# Patient Record
Sex: FEMALE | Race: WHITE | NOT HISPANIC OR LATINO | Employment: OTHER | URBAN - METROPOLITAN AREA
[De-identification: names, ages, dates, MRNs, and addresses within clinical notes are randomized per-mention and may not be internally consistent; named-entity substitution may affect disease eponyms.]

---

## 2017-02-21 ENCOUNTER — OFFICE VISIT (OUTPATIENT)
Dept: OBSTETRICS AND GYNECOLOGY | Facility: CLINIC | Age: 62
End: 2017-02-21

## 2017-02-21 VITALS
DIASTOLIC BLOOD PRESSURE: 60 MMHG | BODY MASS INDEX: 35.04 KG/M2 | WEIGHT: 190.4 LBS | HEIGHT: 62 IN | SYSTOLIC BLOOD PRESSURE: 110 MMHG

## 2017-02-21 DIAGNOSIS — R39.9 UTI SYMPTOMS: Primary | ICD-10-CM

## 2017-02-21 LAB
BILIRUB BLD-MCNC: NEGATIVE MG/DL
CLARITY, POC: CLEAR
COLOR UR: YELLOW
GLUCOSE UR STRIP-MCNC: ABNORMAL MG/DL
KETONES UR QL: NEGATIVE
LEUKOCYTE EST, POC: NEGATIVE
NITRITE UR-MCNC: NEGATIVE MG/ML
PH UR: 7 [PH] (ref 5–8)
PROT UR STRIP-MCNC: NEGATIVE MG/DL
RBC # UR STRIP: NEGATIVE /UL
SP GR UR: 1.01 (ref 1–1.03)
UROBILINOGEN UR QL: NORMAL

## 2017-02-21 PROCEDURE — G0101 CA SCREEN;PELVIC/BREAST EXAM: HCPCS | Performed by: NURSE PRACTITIONER

## 2017-02-21 PROCEDURE — 81002 URINALYSIS NONAUTO W/O SCOPE: CPT | Performed by: NURSE PRACTITIONER

## 2017-02-21 RX ORDER — INSULIN DEGLUDEC 200 U/ML
32 INJECTION, SOLUTION SUBCUTANEOUS DAILY
Refills: 0 | COMMUNITY
Start: 2016-12-29

## 2017-02-21 RX ORDER — OMEPRAZOLE 40 MG/1
40 CAPSULE, DELAYED RELEASE ORAL DAILY PRN
Refills: 4 | COMMUNITY
Start: 2017-02-08 | End: 2020-06-04

## 2017-02-21 RX ORDER — DULOXETIN HYDROCHLORIDE 60 MG/1
60 CAPSULE, DELAYED RELEASE ORAL DAILY
Refills: 1 | COMMUNITY
Start: 2017-02-09

## 2017-02-21 NOTE — PROGRESS NOTES
Tran Parra is a 61 y.o. female.   Chief Complaint   Patient presents with   • Gynecologic Exam     patient here for annual.      HPI:pt here for annual exam, no c/o since last visit    The following portions of the patient's history were reviewed and updated as appropriate: allergies, current medications, past family history, past medical history, past social history, past surgical history and problem list.    Review of Systems  Review of Systems   Constitutional: Negative.  Negative for unexpected weight change.   Respiratory: Negative for chest tightness and shortness of breath.    Cardiovascular: Negative for chest pain and palpitations.   Gastrointestinal: Negative for abdominal pain and blood in stool.   Endocrine: Negative.    Genitourinary: Negative for dyspareunia, dysuria, frequency, hematuria, menstrual problem, pelvic pain, vaginal bleeding, vaginal discharge and vaginal pain.   Musculoskeletal: Negative for joint swelling.   Skin: Negative for color change, rash and wound.   Allergic/Immunologic: Negative.    Psychiatric/Behavioral: Negative.    All other systems reviewed and are negative.      Objective   Physical Exam   Constitutional: She is oriented to person, place, and time. She appears well-developed and well-nourished.   HENT:   Head: Normocephalic.   Neck: Normal range of motion.   Cardiovascular: Normal rate and regular rhythm.    Pulmonary/Chest: Effort normal and breath sounds normal. Right breast exhibits no mass and no nipple discharge. Left breast exhibits no mass and no nipple discharge. There is no breast swelling.   Breasts soft without palpable masses   Abdominal: Soft. Bowel sounds are normal.   Genitourinary: Vagina normal and uterus normal. There is no rash or lesion on the right labia. There is no rash or lesion on the left labia. Cervix exhibits no friability. Right adnexum displays no mass, no tenderness and no fullness. Left adnexum displays no mass, no tenderness and no  fullness.   Genitourinary Comments: Ovaries  Within normal limits. Cervix  Within normal limits   Neurological: She is alert and oriented to person, place, and time.   Skin: Skin is warm and dry.   Psychiatric: She has a normal mood and affect. Her behavior is normal.   Vitals reviewed.      Assessment/Plan   Patient Instructions   We discussed cakcium and vit d requirements, she does not take hrt .      Tran was seen today for gynecologic exam.    Diagnoses and all orders for this visit:    UTI symptoms  -     POC Urinalysis Dipstick  -     Pap IG, Rfx HPV ASCU        Return in about 1 year (around 2/21/2018).

## 2017-02-23 LAB
CONV .: NORMAL
CYTOLOGIST CVX/VAG CYTO: NORMAL
CYTOLOGY CVX/VAG DOC THIN PREP: NORMAL
DX ICD CODE: NORMAL
HIV 1 & 2 AB SER-IMP: NORMAL
OTHER STN SPEC: NORMAL
PATH REPORT.FINAL DX SPEC: NORMAL
STAT OF ADQ CVX/VAG CYTO-IMP: NORMAL

## 2018-07-27 ENCOUNTER — TRANSCRIBE ORDERS (OUTPATIENT)
Dept: OBSTETRICS AND GYNECOLOGY | Facility: CLINIC | Age: 63
End: 2018-07-27

## 2018-07-27 DIAGNOSIS — Z12.31 VISIT FOR SCREENING MAMMOGRAM: Primary | ICD-10-CM

## 2018-09-05 DIAGNOSIS — Z12.31 VISIT FOR SCREENING MAMMOGRAM: Primary | ICD-10-CM

## 2018-09-06 ENCOUNTER — HOSPITAL ENCOUNTER (OUTPATIENT)
Dept: MAMMOGRAPHY | Facility: HOSPITAL | Age: 63
Discharge: HOME | End: 2018-09-06
Admitting: NURSE PRACTITIONER

## 2018-09-06 DIAGNOSIS — Z12.31 VISIT FOR SCREENING MAMMOGRAM: ICD-10-CM

## 2018-09-06 PROCEDURE — 77067 SCR MAMMO BI INCL CAD: CPT

## 2018-09-18 ENCOUNTER — TRANSCRIBE ORDERS (OUTPATIENT)
Dept: ADMINISTRATIVE | Facility: HOSPITAL | Age: 63
End: 2018-09-18

## 2018-09-18 DIAGNOSIS — Z78.0 ASYMPTOMATIC MENOPAUSAL STATE: Primary | ICD-10-CM

## 2018-09-24 ENCOUNTER — APPOINTMENT (OUTPATIENT)
Dept: BONE DENSITY | Facility: HOSPITAL | Age: 63
End: 2018-09-24

## 2018-09-24 DIAGNOSIS — Z78.0 ASYMPTOMATIC MENOPAUSAL STATE: ICD-10-CM

## 2018-09-24 PROCEDURE — 77080 DXA BONE DENSITY AXIAL: CPT

## 2019-02-27 ENCOUNTER — OFFICE VISIT (OUTPATIENT)
Dept: CARDIOLOGY | Facility: CLINIC | Age: 64
End: 2019-02-27

## 2019-02-27 VITALS
WEIGHT: 188 LBS | OXYGEN SATURATION: 98 % | DIASTOLIC BLOOD PRESSURE: 70 MMHG | HEIGHT: 61 IN | SYSTOLIC BLOOD PRESSURE: 120 MMHG | HEART RATE: 83 BPM | BODY MASS INDEX: 35.5 KG/M2

## 2019-02-27 DIAGNOSIS — I42.2 HYPERTROPHIC CARDIOMYOPATHY (HCC): Primary | ICD-10-CM

## 2019-02-27 PROCEDURE — 99203 OFFICE O/P NEW LOW 30 MIN: CPT | Performed by: INTERNAL MEDICINE

## 2019-02-27 PROCEDURE — 93000 ELECTROCARDIOGRAM COMPLETE: CPT | Performed by: INTERNAL MEDICINE

## 2019-02-27 RX ORDER — ASPIRIN 81 MG/1
81 TABLET ORAL DAILY
COMMUNITY
End: 2020-01-28

## 2019-02-27 RX ORDER — FLUTICASONE PROPIONATE 50 MCG
2 SPRAY, SUSPENSION (ML) NASAL DAILY
COMMUNITY

## 2019-02-27 NOTE — PROGRESS NOTES
Date of Office Visit: 2019  Encounter Provider: Deep Pardo MD  Place of Service: [facility]  Patient Name: Tran Parra  :1955  Omar Whitley MD    Cardiomyopathy      History of Present Illness    The patient is a 63-year-old white female that was seen in this office about 5 years ago.  She carries a diagnosis of a nonobstructive cardiomyopathy based on a catheterization that was done in .  There is a 10 mm outflow tract gradient.  Prior to that she had an abnormal stress test indicative of a moderate area of myocardial ischemia involving the inferior wall.  Her catheter demonstrated basically normal coronary arteries.  She was seen here once in  and at that time she was complaining of some shortness of breath but her evaluation was unremarkable.  There have been no cardiac follow-up since that time.  She is here today for a follow-up from her myopathy.  The patient states that she has a family history of hypertrophic myopathy and she wanted to make sure that things were stable.  From a symptomatic standpoint she has exertional shortness of breath as well as easy fatigability and intermittent dizziness.  She is also had syncopal episodes over the past few months.  These episodes sound like they may be autonomic dysfunction in origin.  She describes a headache followed by diaphoresis, a cold clammy feeling in the loss of her bowels.  She states she does have some mild peripheral neuropathy but not serious.    The patient has as noted above no history of coronary disease and there is no history of arrhythmias.  Past Medical History:   Diagnosis Date   • Anemia    • Diabetes mellitus (CMS/HCC)    • Disease of thyroid gland    • Hepatitis B    • Hyperlipidemia    • Hypertension    • Meniere disease    • Menopause    • OAB (overactive bladder)    • Obesity    • Pneumonia    • Shortness of breath          Past Surgical History:   Procedure  Laterality Date   • APPENDECTOMY  1993   • CARDIAC CATHETERIZATION  03/25/2013    Left Heart Catheterization   • CARDIAC CATHETERIZATION  03/25/2013    Right Heart Catheterization   • CARDIAC CATHETERIZATION  03/25/2013    Coronary Arteriography   • CARDIAC CATHETERIZATION  03/25/2013    Left Ventriculography   • CARPAL TUNNEL RELEASE  2011   • CHOLECYSTECTOMY  2002   • DILATATION AND CURETTAGE  11/13/2003    S/ DR. ZULEIKA PACK   • ENDOMETRIAL BIOPSY  04/20/2005    NORMAL   • OSTEOTOMY AND ULNAR SHORTENING  2009   • REPLACEMENT TOTAL KNEE Left 2013    DR. SOSA   • TRANSVAGINAL TAPING SUSPENSION  2004   • TUMOR REMOVAL      HAND- 2008; 2009           Current Outpatient Medications:   •  aspirin 81 MG EC tablet, Take 81 mg by mouth Daily., Disp: , Rfl:   •  B-D UF III MINI PEN NEEDLES 31G X 5 MM misc, U UTD, Disp: , Rfl: 11  •  cetirizine (zyrTEC) 10 MG tablet, Take 1 tablet by mouth Daily., Disp: 30 tablet, Rfl: 0  •  CRANBERRY PO, Take 1 tablet by mouth Daily., Disp: , Rfl:   •  DULoxetine (CYMBALTA) 60 MG capsule, TK 1 C PO D, Disp: , Rfl: 1  •  Empagliflozin-Linagliptin (GLYXAMBI) 25-5 MG tablet, Take 1 tablet by mouth Daily., Disp: , Rfl:   •  fluticasone (FLONASE) 50 MCG/ACT nasal spray, 2 sprays into the nostril(s) as directed by provider Daily., Disp: , Rfl:   •  levothyroxine (SYNTHROID, LEVOTHROID) 112 MCG tablet, Take 112 mcg by mouth Daily., Disp: , Rfl:   •  losartan (COZAAR) 50 MG tablet, Take 50 mg by mouth Daily., Disp: , Rfl:   •  meloxicam (MOBIC) 15 MG tablet, Take 15 mg by mouth Daily., Disp: , Rfl: 2  •  metFORMIN (GLUCOPHAGE) 1000 MG tablet, Take 1,000 mg by mouth 2 (Two) Times a Day With Meals., Disp: , Rfl:   •  omeprazole (priLOSEC) 40 MG capsule, TK ONE C PO  QAM, Disp: , Rfl: 4  •  TRESIBA FLEXTOUCH 200 UNIT/ML solution pen-injector, INJECT 20 UNITS UNDER THE SKIN QD, Disp: , Rfl: 0  •  triamterene-hydrochlorothiazide (MAXZIDE-25) 37.5-25 MG per tablet, Take 1 tablet by mouth Daily.,  "Disp: , Rfl:       Social History     Socioeconomic History   • Marital status:      Spouse name: RADHA   • Number of children: 3   • Years of education: Not on file   • Highest education level: Not on file   Social Needs   • Financial resource strain: Not on file   • Food insecurity - worry: Not on file   • Food insecurity - inability: Not on file   • Transportation needs - medical: Not on file   • Transportation needs - non-medical: Not on file   Occupational History     Employer: RETIRED   Tobacco Use   • Smoking status: Never Smoker   • Smokeless tobacco: Never Used   Substance and Sexual Activity   • Alcohol use: Yes     Comment: social   • Drug use: No   • Sexual activity: No     Partners: Male     Birth control/protection: Post-menopausal   Other Topics Concern   • Not on file   Social History Narrative    OB/GYN PATIENT SINCE 1988.         Review of Systems   Constitution: Positive for malaise/fatigue.   HENT: Negative.    Eyes: Negative.    Cardiovascular: Positive for dyspnea on exertion and syncope.   Respiratory: Negative.    Endocrine: Negative.    Skin: Negative.    Musculoskeletal: Negative.    Gastrointestinal: Negative.    Neurological: Positive for dizziness.   Psychiatric/Behavioral: Negative.        Procedures      ECG 12 Lead  Date/Time: 2/27/2019 3:27 PM  Performed by: Deep Pardo MD  Authorized by: Deep Pardo MD   Comparison: compared with previous ECG from 9/10/2013  Comparison to previous ECG: The loss of R wave extends now throughout the entire precordium  Rhythm: sinus rhythm  Rate: normal  QRS axis: left                  Objective:    /70 (BP Location: Left arm)   Pulse 83   Ht 154.9 cm (61\")   Wt 85.3 kg (188 lb)   SpO2 98%   BMI 35.52 kg/m²         Physical Exam   Constitutional: She is oriented to person, place, and time. She appears well-developed and well-nourished.   HENT:   Head: Normocephalic.   Eyes: Pupils are equal, round, and reactive to " light.   Neck: Normal range of motion. No JVD present. Carotid bruit is not present. No thyromegaly present.   Cardiovascular: Normal rate, regular rhythm, S1 normal, S2 normal and intact distal pulses. Exam reveals no gallop and no friction rub.   Murmur heard.   Medium-pitched mid to late systolic murmur is present with a grade of 1/6 at the upper right sternal border.  Pulmonary/Chest: Effort normal and breath sounds normal.   Abdominal: Soft. Bowel sounds are normal.   Musculoskeletal: She exhibits no edema.   Neurological: She is alert and oriented to person, place, and time.   Skin: Skin is warm, dry and intact. No erythema.   Psychiatric: She has a normal mood and affect.   Vitals reviewed.          Assessment:       Diagnosis Plan   1. Hypertrophic cardiomyopathy (CMS/HCC)  Adult Transthoracic Echo Complete W/ Cont if Necessary Per Protocol         2.  Diabetes mellitus, type II: On insulin therapy  3.  History of hypothyroidism  4.  Possible autonomic dysfunction     Plan:       Going to obtain the echocardiogram for evaluation at this time.  Further recommendations will follow.

## 2019-03-07 ENCOUNTER — HOSPITAL ENCOUNTER (OUTPATIENT)
Dept: CARDIOLOGY | Facility: HOSPITAL | Age: 64
Discharge: HOME | End: 2019-03-07
Admitting: INTERNAL MEDICINE

## 2019-03-07 VITALS
SYSTOLIC BLOOD PRESSURE: 170 MMHG | HEART RATE: 68 BPM | WEIGHT: 188 LBS | BODY MASS INDEX: 35.5 KG/M2 | HEIGHT: 61 IN | DIASTOLIC BLOOD PRESSURE: 80 MMHG

## 2019-03-07 DIAGNOSIS — I42.2 HYPERTROPHIC CARDIOMYOPATHY (HCC): ICD-10-CM

## 2019-03-07 LAB
ASCENDING AORTA: 3.8 CM
BH CV ECHO MEAS - ACS: 1.6 CM
BH CV ECHO MEAS - AO MAX PG (FULL): 2.9 MMHG
BH CV ECHO MEAS - AO MAX PG: 6.1 MMHG
BH CV ECHO MEAS - AO MEAN PG (FULL): 2.2 MMHG
BH CV ECHO MEAS - AO MEAN PG: 3.8 MMHG
BH CV ECHO MEAS - AO ROOT AREA (BSA CORRECTED): 1.7
BH CV ECHO MEAS - AO ROOT AREA: 7.6 CM^2
BH CV ECHO MEAS - AO ROOT DIAM: 3.1 CM
BH CV ECHO MEAS - AO V2 MAX: 123.1 CM/SEC
BH CV ECHO MEAS - AO V2 MEAN: 92.2 CM/SEC
BH CV ECHO MEAS - AO V2 VTI: 27.9 CM
BH CV ECHO MEAS - ASC AORTA: 3.8 CM
BH CV ECHO MEAS - AVA(I,A): 2 CM^2
BH CV ECHO MEAS - AVA(I,D): 2 CM^2
BH CV ECHO MEAS - AVA(V,A): 2.2 CM^2
BH CV ECHO MEAS - AVA(V,D): 2.2 CM^2
BH CV ECHO MEAS - BSA(HAYCOCK): 2 M^2
BH CV ECHO MEAS - BSA: 1.8 M^2
BH CV ECHO MEAS - BZI_BMI: 35.5 KILOGRAMS/M^2
BH CV ECHO MEAS - BZI_METRIC_HEIGHT: 154.9 CM
BH CV ECHO MEAS - BZI_METRIC_WEIGHT: 85.3 KG
BH CV ECHO MEAS - EDV(MOD-SP2): 45 ML
BH CV ECHO MEAS - EDV(MOD-SP4): 54 ML
BH CV ECHO MEAS - EDV(TEICH): 65.1 ML
BH CV ECHO MEAS - EF(CUBED): 80.1 %
BH CV ECHO MEAS - EF(MOD-BP): 67 %
BH CV ECHO MEAS - EF(MOD-SP2): 64.4 %
BH CV ECHO MEAS - EF(MOD-SP4): 68.5 %
BH CV ECHO MEAS - EF(TEICH): 73.2 %
BH CV ECHO MEAS - ESV(MOD-SP2): 16 ML
BH CV ECHO MEAS - ESV(MOD-SP4): 17 ML
BH CV ECHO MEAS - ESV(TEICH): 17.5 ML
BH CV ECHO MEAS - FS: 41.6 %
BH CV ECHO MEAS - IVS/LVPW: 1
BH CV ECHO MEAS - IVSD: 1.2 CM
BH CV ECHO MEAS - LAT PEAK E' VEL: 8 CM/SEC
BH CV ECHO MEAS - LV DIASTOLIC VOL/BSA (35-75): 29.3 ML/M^2
BH CV ECHO MEAS - LV MASS(C)D: 160 GRAMS
BH CV ECHO MEAS - LV MASS(C)DI: 87 GRAMS/M^2
BH CV ECHO MEAS - LV MAX PG: 3.2 MMHG
BH CV ECHO MEAS - LV MEAN PG: 1.6 MMHG
BH CV ECHO MEAS - LV SYSTOLIC VOL/BSA (12-30): 9.2 ML/M^2
BH CV ECHO MEAS - LV V1 MAX: 89.2 CM/SEC
BH CV ECHO MEAS - LV V1 MEAN: 58.2 CM/SEC
BH CV ECHO MEAS - LV V1 VTI: 18.2 CM
BH CV ECHO MEAS - LVIDD: 3.9 CM
BH CV ECHO MEAS - LVIDS: 2.3 CM
BH CV ECHO MEAS - LVLD AP2: 7.2 CM
BH CV ECHO MEAS - LVLD AP4: 7 CM
BH CV ECHO MEAS - LVLS AP2: 5.6 CM
BH CV ECHO MEAS - LVLS AP4: 5.7 CM
BH CV ECHO MEAS - LVOT AREA (M): 3.1 CM^2
BH CV ECHO MEAS - LVOT AREA: 3.1 CM^2
BH CV ECHO MEAS - LVOT DIAM: 2 CM
BH CV ECHO MEAS - LVPWD: 1.2 CM
BH CV ECHO MEAS - MED PEAK E' VEL: 8 CM/SEC
BH CV ECHO MEAS - MV A DUR: 0.11 SEC
BH CV ECHO MEAS - MV A MAX VEL: 82.9 CM/SEC
BH CV ECHO MEAS - MV DEC SLOPE: 278.4 CM/SEC^2
BH CV ECHO MEAS - MV DEC TIME: 0.21 SEC
BH CV ECHO MEAS - MV E MAX VEL: 46.1 CM/SEC
BH CV ECHO MEAS - MV E/A: 0.56
BH CV ECHO MEAS - MV MAX PG: 5.3 MMHG
BH CV ECHO MEAS - MV MEAN PG: 1.6 MMHG
BH CV ECHO MEAS - MV P1/2T MAX VEL: 50 CM/SEC
BH CV ECHO MEAS - MV P1/2T: 52.6 MSEC
BH CV ECHO MEAS - MV V2 MAX: 114.6 CM/SEC
BH CV ECHO MEAS - MV V2 MEAN: 53.7 CM/SEC
BH CV ECHO MEAS - MV V2 VTI: 21.5 CM
BH CV ECHO MEAS - MVA P1/2T LCG: 4.4 CM^2
BH CV ECHO MEAS - MVA(P1/2T): 4.2 CM^2
BH CV ECHO MEAS - MVA(VTI): 2.6 CM^2
BH CV ECHO MEAS - PA ACC TIME: 0.17 SEC
BH CV ECHO MEAS - PA MAX PG (FULL): 0.85 MMHG
BH CV ECHO MEAS - PA MAX PG: 2.2 MMHG
BH CV ECHO MEAS - PA PR(ACCEL): 1.4 MMHG
BH CV ECHO MEAS - PA V2 MAX: 74.7 CM/SEC
BH CV ECHO MEAS - PULM A REVS DUR: 0.11 SEC
BH CV ECHO MEAS - PULM A REVS VEL: 28.1 CM/SEC
BH CV ECHO MEAS - PULM DIAS VEL: 32 CM/SEC
BH CV ECHO MEAS - PULM S/D: 1.4
BH CV ECHO MEAS - PULM SYS VEL: 45.1 CM/SEC
BH CV ECHO MEAS - PVA(V,A): 2.4 CM^2
BH CV ECHO MEAS - PVA(V,D): 2.4 CM^2
BH CV ECHO MEAS - QP/QS: 0.8
BH CV ECHO MEAS - RV MAX PG: 1.4 MMHG
BH CV ECHO MEAS - RV MEAN PG: 0.85 MMHG
BH CV ECHO MEAS - RV V1 MAX: 58.7 CM/SEC
BH CV ECHO MEAS - RV V1 MEAN: 42.8 CM/SEC
BH CV ECHO MEAS - RV V1 VTI: 14.6 CM
BH CV ECHO MEAS - RVOT AREA: 3.1 CM^2
BH CV ECHO MEAS - RVOT DIAM: 2 CM
BH CV ECHO MEAS - SI(AO): 115.1 ML/M^2
BH CV ECHO MEAS - SI(CUBED): 25.4 ML/M^2
BH CV ECHO MEAS - SI(LVOT): 30.7 ML/M^2
BH CV ECHO MEAS - SI(MOD-SP2): 15.8 ML/M^2
BH CV ECHO MEAS - SI(MOD-SP4): 20.1 ML/M^2
BH CV ECHO MEAS - SI(TEICH): 25.9 ML/M^2
BH CV ECHO MEAS - SUP REN AO DIAM: 2 CM
BH CV ECHO MEAS - SV(AO): 211.8 ML
BH CV ECHO MEAS - SV(CUBED): 46.7 ML
BH CV ECHO MEAS - SV(LVOT): 56.5 ML
BH CV ECHO MEAS - SV(MOD-SP2): 29 ML
BH CV ECHO MEAS - SV(MOD-SP4): 37 ML
BH CV ECHO MEAS - SV(RVOT): 45.4 ML
BH CV ECHO MEAS - SV(TEICH): 47.6 ML
BH CV ECHO MEAS - TAPSE (>1.6): 2.3 CM2
BH CV ECHO MEASUREMENTS AVERAGE E/E' RATIO: 5.76
BH CV XLRA - RV BASE: 2.9 CM
BH CV XLRA - TDI S': 12 CM/SEC
LEFT ATRIUM VOLUME INDEX: 12 ML/M2
SINUS: 3.5 CM
STJ: 3.5 CM

## 2019-03-07 PROCEDURE — 93306 TTE W/DOPPLER COMPLETE: CPT | Performed by: INTERNAL MEDICINE

## 2019-03-07 PROCEDURE — 93306 TTE W/DOPPLER COMPLETE: CPT

## 2019-07-29 ENCOUNTER — TRANSCRIBE ORDERS (OUTPATIENT)
Dept: ADMINISTRATIVE | Facility: HOSPITAL | Age: 64
End: 2019-07-29

## 2019-07-29 DIAGNOSIS — Z13.9 VISIT FOR SCREENING: Primary | ICD-10-CM

## 2019-09-10 ENCOUNTER — APPOINTMENT (OUTPATIENT)
Dept: MAMMOGRAPHY | Facility: HOSPITAL | Age: 64
End: 2019-09-10

## 2020-01-22 ENCOUNTER — HOSPITAL ENCOUNTER (OUTPATIENT)
Dept: MAMMOGRAPHY | Facility: HOSPITAL | Age: 65
Discharge: HOME | End: 2020-01-22
Admitting: NURSE PRACTITIONER

## 2020-01-22 DIAGNOSIS — Z13.9 VISIT FOR SCREENING: ICD-10-CM

## 2020-01-22 PROCEDURE — 77063 BREAST TOMOSYNTHESIS BI: CPT

## 2020-01-22 PROCEDURE — 77067 SCR MAMMO BI INCL CAD: CPT

## 2020-01-24 ENCOUNTER — TELEPHONE (OUTPATIENT)
Dept: OBSTETRICS AND GYNECOLOGY | Age: 65
End: 2020-01-24

## 2020-01-24 NOTE — TELEPHONE ENCOUNTER
----- Message from GENESIS Hernandez sent at 1/22/2020 11:38 AM EST -----  Please call pt and notify of normal mammogram results. Repeat 1 year.

## 2020-01-28 ENCOUNTER — HOSPITAL ENCOUNTER (OUTPATIENT)
Dept: GENERAL RADIOLOGY | Facility: HOSPITAL | Age: 65
Discharge: HOME | End: 2020-01-28

## 2020-01-28 ENCOUNTER — HOSPITAL ENCOUNTER (OUTPATIENT)
Dept: GENERAL RADIOLOGY | Facility: HOSPITAL | Age: 65
Discharge: HOME | End: 2020-01-28
Admitting: ORTHOPAEDIC SURGERY

## 2020-01-28 ENCOUNTER — APPOINTMENT (OUTPATIENT)
Dept: PREADMISSION TESTING | Facility: HOSPITAL | Age: 65
End: 2020-01-28

## 2020-01-28 VITALS
SYSTOLIC BLOOD PRESSURE: 120 MMHG | RESPIRATION RATE: 18 BRPM | WEIGHT: 175 LBS | DIASTOLIC BLOOD PRESSURE: 76 MMHG | HEART RATE: 78 BPM | TEMPERATURE: 97.3 F | HEIGHT: 61 IN | BODY MASS INDEX: 33.04 KG/M2 | OXYGEN SATURATION: 94 %

## 2020-01-28 LAB
ALBUMIN SERPL-MCNC: 4 G/DL (ref 3.5–5.2)
ALBUMIN/GLOB SERPL: 1.6 G/DL
ALP SERPL-CCNC: 100 U/L (ref 39–117)
ALT SERPL W P-5'-P-CCNC: 18 U/L (ref 1–33)
ANION GAP SERPL CALCULATED.3IONS-SCNC: 13.4 MMOL/L (ref 5–15)
AST SERPL-CCNC: 18 U/L (ref 1–32)
BACTERIA UR QL AUTO: ABNORMAL /HPF
BILIRUB SERPL-MCNC: 0.3 MG/DL (ref 0.2–1.2)
BILIRUB UR QL STRIP: NEGATIVE
BUN BLD-MCNC: 11 MG/DL (ref 8–23)
BUN/CREAT SERPL: 9.6 (ref 7–25)
CALCIUM SPEC-SCNC: 8.9 MG/DL (ref 8.6–10.5)
CHLORIDE SERPL-SCNC: 93 MMOL/L (ref 98–107)
CLARITY UR: CLEAR
CO2 SERPL-SCNC: 25.6 MMOL/L (ref 22–29)
COLOR UR: YELLOW
CREAT BLD-MCNC: 1.15 MG/DL (ref 0.57–1)
DEPRECATED RDW RBC AUTO: 40.6 FL (ref 37–54)
ERYTHROCYTE [DISTWIDTH] IN BLOOD BY AUTOMATED COUNT: 15.1 % (ref 12.3–15.4)
GFR SERPL CREATININE-BSD FRML MDRD: 47 ML/MIN/1.73
GLOBULIN UR ELPH-MCNC: 2.5 GM/DL
GLUCOSE BLD-MCNC: 300 MG/DL (ref 65–99)
GLUCOSE UR STRIP-MCNC: ABNORMAL MG/DL
HBA1C MFR BLD: 8.1 % (ref 4.8–5.6)
HCT VFR BLD AUTO: 39.1 % (ref 34–46.6)
HGB BLD-MCNC: 12.4 G/DL (ref 12–15.9)
HGB UR QL STRIP.AUTO: ABNORMAL
HYALINE CASTS UR QL AUTO: ABNORMAL /LPF
INR PPP: 0.88 (ref 0.9–1.1)
KETONES UR QL STRIP: NEGATIVE
LEUKOCYTE ESTERASE UR QL STRIP.AUTO: ABNORMAL
MCH RBC QN AUTO: 24.3 PG (ref 26.6–33)
MCHC RBC AUTO-ENTMCNC: 31.7 G/DL (ref 31.5–35.7)
MCV RBC AUTO: 76.5 FL (ref 79–97)
NITRITE UR QL STRIP: NEGATIVE
PH UR STRIP.AUTO: 6 [PH] (ref 5–8)
PLATELET # BLD AUTO: 219 10*3/MM3 (ref 140–450)
PMV BLD AUTO: 9.9 FL (ref 6–12)
POTASSIUM BLD-SCNC: 4.1 MMOL/L (ref 3.5–5.2)
PROT SERPL-MCNC: 6.5 G/DL (ref 6–8.5)
PROT UR QL STRIP: NEGATIVE
PROTHROMBIN TIME: 11.7 SECONDS (ref 11.7–14.2)
RBC # BLD AUTO: 5.11 10*6/MM3 (ref 3.77–5.28)
RBC # UR: ABNORMAL /HPF
REF LAB TEST METHOD: ABNORMAL
SODIUM BLD-SCNC: 132 MMOL/L (ref 136–145)
SP GR UR STRIP: 1.02 (ref 1–1.03)
SQUAMOUS #/AREA URNS HPF: ABNORMAL /HPF
UROBILINOGEN UR QL STRIP: ABNORMAL
WBC NRBC COR # BLD: 3.8 10*3/MM3 (ref 3.4–10.8)
WBC UR QL AUTO: ABNORMAL /HPF
YEAST URNS QL MICRO: ABNORMAL /HPF

## 2020-01-28 PROCEDURE — 73502 X-RAY EXAM HIP UNI 2-3 VIEWS: CPT

## 2020-01-28 PROCEDURE — A9270 NON-COVERED ITEM OR SERVICE: HCPCS | Performed by: ORTHOPAEDIC SURGERY

## 2020-01-28 PROCEDURE — 71046 X-RAY EXAM CHEST 2 VIEWS: CPT

## 2020-01-28 PROCEDURE — 93010 ELECTROCARDIOGRAM REPORT: CPT | Performed by: INTERNAL MEDICINE

## 2020-01-28 PROCEDURE — 85610 PROTHROMBIN TIME: CPT | Performed by: ORTHOPAEDIC SURGERY

## 2020-01-28 PROCEDURE — 36415 COLL VENOUS BLD VENIPUNCTURE: CPT

## 2020-01-28 PROCEDURE — 63710000001 MUPIROCIN 2 % OINTMENT: Performed by: ORTHOPAEDIC SURGERY

## 2020-01-28 PROCEDURE — 85027 COMPLETE CBC AUTOMATED: CPT | Performed by: ORTHOPAEDIC SURGERY

## 2020-01-28 PROCEDURE — 80053 COMPREHEN METABOLIC PANEL: CPT | Performed by: ORTHOPAEDIC SURGERY

## 2020-01-28 PROCEDURE — 83036 HEMOGLOBIN GLYCOSYLATED A1C: CPT | Performed by: ORTHOPAEDIC SURGERY

## 2020-01-28 PROCEDURE — 81001 URINALYSIS AUTO W/SCOPE: CPT | Performed by: ORTHOPAEDIC SURGERY

## 2020-01-28 PROCEDURE — 93005 ELECTROCARDIOGRAM TRACING: CPT

## 2020-01-28 RX ORDER — SIMVASTATIN 40 MG
40 TABLET ORAL NIGHTLY
COMMUNITY

## 2020-01-28 ASSESSMENT — HOOS JR
HOOS JR SCORE: 18
HOOS JR SCORE: 32.735

## 2020-01-28 NOTE — DISCHARGE INSTRUCTIONS
.Take the following medications the morning of surgery:  LOSARTAN      General Instructions: CLEAR LIQUIDS UNTIL 5:00 AM MORNING OF SURGERY  • Do not eat solid food after midnight the night before surgery.  • You may drink clear liquids day of surgery but must stop at least one hour before your hospital arrival time.  • It is beneficial for you to have a clear drink that contains carbohydrates the day of surgery.  We suggest a 12 to 20 ounce bottle of Gatorade or Powerade for non-diabetic patients or a 12 to 20 ounce bottle of G2 or Powerade Zero for diabetic patients. (Pediatric patients, are not advised to drink a 12 to 20 ounce carbohydrate drink)    Clear liquids are liquids you can see through.  Nothing red in color.     Plain water                               Sports drinks  Sodas                                   Gelatin (Jell-O)  Fruit juices without pulp such as white grape juice and apple juice  Popsicles that contain no fruit or yogurt  Tea or coffee (no cream or milk added)  Gatorade / Powerade  G2 / Powerade Zero    • Infants may have breast milk up to four hours before surgery.  • Infants drinking formula may drink formula up to six hours before surgery.   • Patients who avoid smoking, chewing tobacco and alcohol for 4 weeks prior to surgery have a reduced risk of post-operative complications.  Quit smoking as many days before surgery as you can.  • Do not smoke, use chewing tobacco or drink alcohol the day of surgery.   • If applicable bring your C-PAP/ BI-PAP machine.  • Bring any papers given to you in the doctor’s office.  • Wear clean comfortable clothes.  • Do not wear contact lenses, false eyelashes or make-up.  Bring a case for your glasses.   • Bring crutches or walker if applicable.  • Remove all piercings.  Leave jewelry and any other valuables at home.  • Hair extensions with metal clips must be removed prior to surgery.  • The Pre-Admission Testing nurse will instruct you to bring  medications if unable to obtain an accurate list in Pre-Admission Testing.        If you were given a blood bank ID arm band remember to bring it with you the day of surgery.    Preventing a Surgical Site Infection:  • For 2 to 3 days before surgery, avoid shaving with a razor because the razor can irritate skin and make it easier to develop an infection.    • Any areas of open skin can increase the risk of a post-operative wound infection by allowing bacteria to enter and travel throughout the body.  Notify your surgeon if you have any skin wounds / rashes even if it is not near the expected surgical site.  The area will need assessed to determine if surgery should be delayed until it is healed.  • The night prior to surgery sleep in a clean bed with clean clothing.  Do not allow pets to sleep with you.  • Shower on the morning of surgery using a fresh bar of anti-bacterial soap (such as Dial) and clean washcloth.  Dry with a clean towel and dress in clean clothing.  • Ask your surgeon if you will be receiving antibiotics prior to surgery.  • Make sure you, your family, and all healthcare providers clean their hands with soap and water or an alcohol based hand  before caring for you or your wound.    Day of surgery: 2/4/2020 ARRIVAL TIME 6:00 AM  Your arrival time is approximately two hours before your scheduled surgery time.  Upon arrival, a Pre-op nurse and Anesthesiologist will review your health history, obtain vital signs, and answer questions you may have.  The only belongings needed at this time will be a list of your home medications and if applicable your C-PAP/BI-PAP machine.  If you are staying overnight your family can leave the rest of your belongings in the car and bring them to your room later.  A Pre-op nurse will start an IV and you may receive medication in preparation for surgery, including something to help you relax.  Your family will be able to see you in the Pre-op area.  Two  visitors at a time will be allowed in the Pre-op room.  While you are in surgery your family should notify the waiting room  if they leave the waiting room area and provide a contact phone number.    Please be aware that surgery does come with discomfort.  We want to make every effort to control your discomfort so please discuss any uncontrolled symptoms with your nurse.   Your doctor will most likely have prescribed pain medications.      If you are going home after surgery you will receive individualized written care instructions before being discharged.  A responsible adult must drive you to and from the hospital on the day of your surgery and stay with you for 24 hours.    If you are staying overnight following surgery, you will be transported to your hospital room following the recovery period.  [facility] has all private rooms.    If you have any questions please call Pre-Admission Testing at [phone].  Deductibles and co-payments are collected on the day of service. Please be prepared to pay the required co-pay, deductible or deposit on the day of service as defined by your plan.  2% CHLORHEXIDINE GLUCONATE* CLOTH  Preparing or “prepping” skin before surgery can reduce the risk of infection at the surgical site. To make the process easier, [facility] has chosen disposable cloths moistened with a rinse-free, 2% Chlorhexidine Gluconate (CHG) antiseptic solution. The steps below outline the prepping process and should be carefully followed.        Use the prep cloth on the area that is circled in the diagram             Directions Night before Surgery  1) Shower using a fresh bar of anti-bacterial soap (such as Dial) and clean washcloth.  Use a clean towel to completely dry your skin.  2) Do not use any lotions, oils or creams on your skin.  3) Open the package and remove 1 cloth, wipe your skin for 30 seconds in a circular motion.  Allow to dry for 3  minutes.  4) Repeat #3 with second cloth.  5) Do not touch your eyes, ears, or mouth with the prep cloth.  6) Allow the wet prep solution to air dry.  7) Discard the prep cloth and wash your hands with soap and water.   8) Dress in clean bed clothes and sleep on fresh clean bed sheets.   9) You may experience some temporary itching after the prep.    Directions Day of Surgery  1) Repeat steps 1,2,3,4,5,6,7, and 9.   2) Dress in clean clothes before coming to the hospital.    BACTROBAN NASAL OINTMENT  There are many germs normally in your nose. Bactroban is an ointment that will help reduce these germs. Please follow these instructions for Bactroban use:      ____The day before surgery in the morning  Date________    ____The day before surgery in the evening              Date________    ____The day of surgery in the morning    Date________    **Squirt ½ package of Bactroban Ointment onto a cotton applicator and apply to inside of 1st nostril.  Squirt the remaining Bactroban and apply to the inside of the other nostril.

## 2020-02-04 ENCOUNTER — APPOINTMENT (OUTPATIENT)
Dept: GENERAL RADIOLOGY | Facility: HOSPITAL | Age: 65
End: 2020-02-04

## 2020-02-04 ENCOUNTER — ANESTHESIA (OUTPATIENT)
Dept: PERIOP | Facility: HOSPITAL | Age: 65
End: 2020-02-04

## 2020-02-04 ENCOUNTER — HOSPITAL ENCOUNTER (INPATIENT)
Facility: HOSPITAL | Age: 65
LOS: 2 days | Discharge: HOME HEALTH | End: 2020-02-06
Attending: ORTHOPAEDIC SURGERY | Admitting: ORTHOPAEDIC SURGERY

## 2020-02-04 ENCOUNTER — ANESTHESIA EVENT (OUTPATIENT)
Dept: PERIOP | Facility: HOSPITAL | Age: 65
End: 2020-02-04

## 2020-02-04 DIAGNOSIS — M16.12 PRIMARY OSTEOARTHRITIS OF LEFT HIP: Primary | ICD-10-CM

## 2020-02-04 LAB
GLUCOSE BLDC GLUCOMTR-MCNC: 163 MG/DL (ref 70–130)
GLUCOSE BLDC GLUCOMTR-MCNC: 179 MG/DL (ref 70–130)
GLUCOSE BLDC GLUCOMTR-MCNC: 216 MG/DL (ref 70–130)
GLUCOSE BLDC GLUCOMTR-MCNC: 223 MG/DL (ref 70–130)
GLUCOSE BLDC GLUCOMTR-MCNC: 260 MG/DL (ref 70–130)

## 2020-02-04 PROCEDURE — 25010000002 FENTANYL CITRATE (PF) 100 MCG/2ML SOLUTION: Performed by: NURSE ANESTHETIST, CERTIFIED REGISTERED

## 2020-02-04 PROCEDURE — C1776 JOINT DEVICE (IMPLANTABLE): HCPCS | Performed by: ORTHOPAEDIC SURGERY

## 2020-02-04 PROCEDURE — 25010000002 DEXAMETHASONE PER 1 MG: Performed by: NURSE ANESTHETIST, CERTIFIED REGISTERED

## 2020-02-04 PROCEDURE — 25010000002 ROPIVACAINE PER 1 MG: Performed by: ANESTHESIOLOGY

## 2020-02-04 PROCEDURE — 63710000001 INSULIN GLARGINE PER 5 UNITS: Performed by: ORTHOPAEDIC SURGERY

## 2020-02-04 PROCEDURE — 25010000002 KETOROLAC TROMETHAMINE PER 15 MG: Performed by: ORTHOPAEDIC SURGERY

## 2020-02-04 PROCEDURE — 82962 GLUCOSE BLOOD TEST: CPT

## 2020-02-04 PROCEDURE — 25010000002 ONDANSETRON PER 1 MG: Performed by: ORTHOPAEDIC SURGERY

## 2020-02-04 PROCEDURE — 25010000002 ONDANSETRON PER 1 MG: Performed by: NURSE ANESTHETIST, CERTIFIED REGISTERED

## 2020-02-04 PROCEDURE — 25010000002 FENTANYL CITRATE (PF) 100 MCG/2ML SOLUTION: Performed by: ANESTHESIOLOGY

## 2020-02-04 PROCEDURE — 25010000002 MORPHINE (PF) 10 MG/ML SOLUTION 1 ML VIAL: Performed by: ORTHOPAEDIC SURGERY

## 2020-02-04 PROCEDURE — 97161 PT EVAL LOW COMPLEX 20 MIN: CPT

## 2020-02-04 PROCEDURE — 25010000002 ROPIVACAINE PER 1 MG: Performed by: ORTHOPAEDIC SURGERY

## 2020-02-04 PROCEDURE — 25010000002 PROPOFOL 10 MG/ML EMULSION: Performed by: NURSE ANESTHETIST, CERTIFIED REGISTERED

## 2020-02-04 PROCEDURE — 25010000002 MIDAZOLAM PER 1 MG: Performed by: ANESTHESIOLOGY

## 2020-02-04 PROCEDURE — 25010000002 NEOSTIGMINE PER 0.5 MG: Performed by: NURSE ANESTHETIST, CERTIFIED REGISTERED

## 2020-02-04 PROCEDURE — 25010000002 PHENYLEPHRINE PER 1 ML: Performed by: NURSE ANESTHETIST, CERTIFIED REGISTERED

## 2020-02-04 PROCEDURE — 25010000002 PROMETHAZINE PER 50 MG: Performed by: ORTHOPAEDIC SURGERY

## 2020-02-04 PROCEDURE — 25010000002 HYDROMORPHONE PER 4 MG: Performed by: NURSE ANESTHETIST, CERTIFIED REGISTERED

## 2020-02-04 PROCEDURE — 73501 X-RAY EXAM HIP UNI 1 VIEW: CPT

## 2020-02-04 PROCEDURE — 0SRB04Z REPLACEMENT OF LEFT HIP JOINT WITH CERAMIC ON POLYETHYLENE SYNTHETIC SUBSTITUTE, OPEN APPROACH: ICD-10-PCS | Performed by: ORTHOPAEDIC SURGERY

## 2020-02-04 PROCEDURE — C1713 ANCHOR/SCREW BN/BN,TIS/BN: HCPCS | Performed by: ORTHOPAEDIC SURGERY

## 2020-02-04 PROCEDURE — 97110 THERAPEUTIC EXERCISES: CPT

## 2020-02-04 DEVICE — HD FEM/HIP G7 BIOLOX/DELTA OPTN 32MM: Type: IMPLANTABLE DEVICE | Site: HIP | Status: FUNCTIONAL

## 2020-02-04 DEVICE — LINER ACET RINGLC HIWALL PLS3 32 SZ22: Type: IMPLANTABLE DEVICE | Site: HIP | Status: FUNCTIONAL

## 2020-02-04 DEVICE — TOTAL HIP PRIMARY: Type: IMPLANTABLE DEVICE | Site: HIP | Status: FUNCTIONAL

## 2020-02-04 DEVICE — WAX,BONE,NATURAL
Type: IMPLANTABLE DEVICE | Site: HIP | Status: FUNCTIONAL
Brand: MEDLINE INDUSTRIES

## 2020-02-04 DEVICE — IMPLANTABLE DEVICE: Type: IMPLANTABLE DEVICE | Site: HIP | Status: FUNCTIONAL

## 2020-02-04 DEVICE — STEM FEM/HIP TAPERLOC COMPL F/P STD OFFST SZ6: Type: IMPLANTABLE DEVICE | Site: HIP | Status: FUNCTIONAL

## 2020-02-04 DEVICE — CAP CERAM HD HIP UPCHRG: Type: IMPLANTABLE DEVICE | Site: HIP | Status: FUNCTIONAL

## 2020-02-04 DEVICE — CAP HIP VE UPCHRG: Type: IMPLANTABLE DEVICE | Site: HIP | Status: FUNCTIONAL

## 2020-02-04 DEVICE — ADAPT HIP BIOLOX OPTN TYPE1 TPR MIN 6: Type: IMPLANTABLE DEVICE | Site: HIP | Status: FUNCTIONAL

## 2020-02-04 DEVICE — SUT NONABS MAXBRAID NMBR5 K60 38IN WHT/BLU: Type: IMPLANTABLE DEVICE | Site: HIP | Status: FUNCTIONAL

## 2020-02-04 RX ORDER — FENTANYL CITRATE 50 UG/ML
50 INJECTION, SOLUTION INTRAMUSCULAR; INTRAVENOUS
Status: DISCONTINUED | OUTPATIENT
Start: 2020-02-04 | End: 2020-02-04 | Stop reason: HOSPADM

## 2020-02-04 RX ORDER — ACETAMINOPHEN 325 MG/1
325 TABLET ORAL EVERY 4 HOURS PRN
Status: DISCONTINUED | OUTPATIENT
Start: 2020-02-04 | End: 2020-02-06 | Stop reason: HOSPADM

## 2020-02-04 RX ORDER — ASPIRIN 325 MG
325 TABLET, DELAYED RELEASE (ENTERIC COATED) ORAL 2 TIMES DAILY WITH MEALS
Status: DISCONTINUED | OUTPATIENT
Start: 2020-02-04 | End: 2020-02-06 | Stop reason: HOSPADM

## 2020-02-04 RX ORDER — FLUTICASONE PROPIONATE 50 MCG
2 SPRAY, SUSPENSION (ML) NASAL DAILY
Status: DISCONTINUED | OUTPATIENT
Start: 2020-02-04 | End: 2020-02-06 | Stop reason: HOSPADM

## 2020-02-04 RX ORDER — LEVOTHYROXINE SODIUM 112 UG/1
112 TABLET ORAL
Status: DISCONTINUED | OUTPATIENT
Start: 2020-02-05 | End: 2020-02-06 | Stop reason: HOSPADM

## 2020-02-04 RX ORDER — CHOLECALCIFEROL (VITAMIN D3) 125 MCG
5 CAPSULE ORAL NIGHTLY PRN
Status: DISCONTINUED | OUTPATIENT
Start: 2020-02-04 | End: 2020-02-06 | Stop reason: HOSPADM

## 2020-02-04 RX ORDER — FAMOTIDINE 10 MG/ML
20 INJECTION, SOLUTION INTRAVENOUS ONCE
Status: COMPLETED | OUTPATIENT
Start: 2020-02-04 | End: 2020-02-04

## 2020-02-04 RX ORDER — PROMETHAZINE HYDROCHLORIDE 25 MG/1
25 SUPPOSITORY RECTAL ONCE AS NEEDED
Status: DISCONTINUED | OUTPATIENT
Start: 2020-02-04 | End: 2020-02-04 | Stop reason: HOSPADM

## 2020-02-04 RX ORDER — SODIUM CHLORIDE, SODIUM LACTATE, POTASSIUM CHLORIDE, CALCIUM CHLORIDE 600; 310; 30; 20 MG/100ML; MG/100ML; MG/100ML; MG/100ML
9 INJECTION, SOLUTION INTRAVENOUS CONTINUOUS
Status: DISCONTINUED | OUTPATIENT
Start: 2020-02-04 | End: 2020-02-06 | Stop reason: HOSPADM

## 2020-02-04 RX ORDER — FERROUS SULFATE 325(65) MG
325 TABLET ORAL
Status: DISCONTINUED | OUTPATIENT
Start: 2020-02-04 | End: 2020-02-06 | Stop reason: HOSPADM

## 2020-02-04 RX ORDER — NALOXONE HCL 0.4 MG/ML
0.4 VIAL (ML) INJECTION
Status: DISCONTINUED | OUTPATIENT
Start: 2020-02-04 | End: 2020-02-06 | Stop reason: HOSPADM

## 2020-02-04 RX ORDER — BISACODYL 10 MG
10 SUPPOSITORY, RECTAL RECTAL DAILY PRN
Status: DISCONTINUED | OUTPATIENT
Start: 2020-02-04 | End: 2020-02-06 | Stop reason: HOSPADM

## 2020-02-04 RX ORDER — DULOXETIN HYDROCHLORIDE 60 MG/1
60 CAPSULE, DELAYED RELEASE ORAL DAILY
Status: DISCONTINUED | OUTPATIENT
Start: 2020-02-04 | End: 2020-02-06 | Stop reason: HOSPADM

## 2020-02-04 RX ORDER — HYDROMORPHONE HYDROCHLORIDE 1 MG/ML
0.5 INJECTION, SOLUTION INTRAMUSCULAR; INTRAVENOUS; SUBCUTANEOUS
Status: DISCONTINUED | OUTPATIENT
Start: 2020-02-04 | End: 2020-02-04 | Stop reason: HOSPADM

## 2020-02-04 RX ORDER — PROMETHAZINE HYDROCHLORIDE 25 MG/ML
12.5 INJECTION, SOLUTION INTRAMUSCULAR; INTRAVENOUS ONCE AS NEEDED
Status: DISCONTINUED | OUTPATIENT
Start: 2020-02-04 | End: 2020-02-04 | Stop reason: HOSPADM

## 2020-02-04 RX ORDER — SODIUM CHLORIDE 0.9 % (FLUSH) 0.9 %
3 SYRINGE (ML) INJECTION EVERY 12 HOURS SCHEDULED
Status: DISCONTINUED | OUTPATIENT
Start: 2020-02-04 | End: 2020-02-06 | Stop reason: HOSPADM

## 2020-02-04 RX ORDER — SODIUM CHLORIDE 0.9 % (FLUSH) 0.9 %
1-10 SYRINGE (ML) INJECTION AS NEEDED
Status: DISCONTINUED | OUTPATIENT
Start: 2020-02-04 | End: 2020-02-06 | Stop reason: HOSPADM

## 2020-02-04 RX ORDER — DEXAMETHASONE SODIUM PHOSPHATE 10 MG/ML
INJECTION INTRAMUSCULAR; INTRAVENOUS AS NEEDED
Status: DISCONTINUED | OUTPATIENT
Start: 2020-02-04 | End: 2020-02-04 | Stop reason: SURG

## 2020-02-04 RX ORDER — NALOXONE HCL 0.4 MG/ML
0.2 VIAL (ML) INJECTION AS NEEDED
Status: DISCONTINUED | OUTPATIENT
Start: 2020-02-04 | End: 2020-02-04 | Stop reason: HOSPADM

## 2020-02-04 RX ORDER — ONDANSETRON 2 MG/ML
4 INJECTION INTRAMUSCULAR; INTRAVENOUS EVERY 6 HOURS PRN
Status: DISCONTINUED | OUTPATIENT
Start: 2020-02-04 | End: 2020-02-06 | Stop reason: HOSPADM

## 2020-02-04 RX ORDER — CLINDAMYCIN PHOSPHATE 900 MG/50ML
900 INJECTION INTRAVENOUS EVERY 8 HOURS
Status: COMPLETED | OUTPATIENT
Start: 2020-02-04 | End: 2020-02-05

## 2020-02-04 RX ORDER — MAGNESIUM HYDROXIDE 1200 MG/15ML
LIQUID ORAL AS NEEDED
Status: DISCONTINUED | OUTPATIENT
Start: 2020-02-04 | End: 2020-02-04 | Stop reason: HOSPADM

## 2020-02-04 RX ORDER — GLYCOPYRROLATE 0.2 MG/ML
INJECTION INTRAMUSCULAR; INTRAVENOUS AS NEEDED
Status: DISCONTINUED | OUTPATIENT
Start: 2020-02-04 | End: 2020-02-04 | Stop reason: SURG

## 2020-02-04 RX ORDER — DIPHENHYDRAMINE HCL 25 MG
25 CAPSULE ORAL
Status: DISCONTINUED | OUTPATIENT
Start: 2020-02-04 | End: 2020-02-04 | Stop reason: HOSPADM

## 2020-02-04 RX ORDER — PANTOPRAZOLE SODIUM 40 MG/1
40 TABLET, DELAYED RELEASE ORAL EVERY MORNING
Status: DISCONTINUED | OUTPATIENT
Start: 2020-02-05 | End: 2020-02-06 | Stop reason: HOSPADM

## 2020-02-04 RX ORDER — ROCURONIUM BROMIDE 10 MG/ML
INJECTION, SOLUTION INTRAVENOUS AS NEEDED
Status: DISCONTINUED | OUTPATIENT
Start: 2020-02-04 | End: 2020-02-04 | Stop reason: SURG

## 2020-02-04 RX ORDER — EPHEDRINE SULFATE 50 MG/ML
5 INJECTION, SOLUTION INTRAVENOUS ONCE AS NEEDED
Status: DISCONTINUED | OUTPATIENT
Start: 2020-02-04 | End: 2020-02-04 | Stop reason: HOSPADM

## 2020-02-04 RX ORDER — ONDANSETRON 2 MG/ML
INJECTION INTRAMUSCULAR; INTRAVENOUS AS NEEDED
Status: DISCONTINUED | OUTPATIENT
Start: 2020-02-04 | End: 2020-02-04 | Stop reason: SURG

## 2020-02-04 RX ORDER — SENNA AND DOCUSATE SODIUM 50; 8.6 MG/1; MG/1
2 TABLET, FILM COATED ORAL 2 TIMES DAILY PRN
Status: DISCONTINUED | OUTPATIENT
Start: 2020-02-04 | End: 2020-02-06 | Stop reason: HOSPADM

## 2020-02-04 RX ORDER — ONDANSETRON 2 MG/ML
4 INJECTION INTRAMUSCULAR; INTRAVENOUS ONCE AS NEEDED
Status: DISCONTINUED | OUTPATIENT
Start: 2020-02-04 | End: 2020-02-04 | Stop reason: HOSPADM

## 2020-02-04 RX ORDER — KETOROLAC TROMETHAMINE 15 MG/ML
15 INJECTION, SOLUTION INTRAMUSCULAR; INTRAVENOUS EVERY 8 HOURS PRN
Status: DISCONTINUED | OUTPATIENT
Start: 2020-02-04 | End: 2020-02-06 | Stop reason: HOSPADM

## 2020-02-04 RX ORDER — OXYCODONE AND ACETAMINOPHEN 7.5; 325 MG/1; MG/1
1 TABLET ORAL ONCE AS NEEDED
Status: DISCONTINUED | OUTPATIENT
Start: 2020-02-04 | End: 2020-02-04 | Stop reason: HOSPADM

## 2020-02-04 RX ORDER — MORPHINE SULFATE 2 MG/ML
6 INJECTION, SOLUTION INTRAMUSCULAR; INTRAVENOUS
Status: DISCONTINUED | OUTPATIENT
Start: 2020-02-04 | End: 2020-02-06 | Stop reason: HOSPADM

## 2020-02-04 RX ORDER — PROMETHAZINE HYDROCHLORIDE 25 MG/ML
6.25 INJECTION, SOLUTION INTRAMUSCULAR; INTRAVENOUS
Status: DISCONTINUED | OUTPATIENT
Start: 2020-02-04 | End: 2020-02-04 | Stop reason: HOSPADM

## 2020-02-04 RX ORDER — ROPIVACAINE HYDROCHLORIDE 2 MG/ML
INJECTION, SOLUTION EPIDURAL; INFILTRATION; PERINEURAL
Status: COMPLETED | OUTPATIENT
Start: 2020-02-04 | End: 2020-02-04

## 2020-02-04 RX ORDER — HYDRALAZINE HYDROCHLORIDE 20 MG/ML
5 INJECTION INTRAMUSCULAR; INTRAVENOUS
Status: DISCONTINUED | OUTPATIENT
Start: 2020-02-04 | End: 2020-02-04 | Stop reason: HOSPADM

## 2020-02-04 RX ORDER — SODIUM CHLORIDE 0.9 % (FLUSH) 0.9 %
3-10 SYRINGE (ML) INJECTION AS NEEDED
Status: DISCONTINUED | OUTPATIENT
Start: 2020-02-04 | End: 2020-02-04 | Stop reason: HOSPADM

## 2020-02-04 RX ORDER — INSULIN GLARGINE 100 [IU]/ML
32 INJECTION, SOLUTION SUBCUTANEOUS DAILY
Status: DISCONTINUED | OUTPATIENT
Start: 2020-02-04 | End: 2020-02-06 | Stop reason: HOSPADM

## 2020-02-04 RX ORDER — TRIAMTERENE AND HYDROCHLOROTHIAZIDE 37.5; 25 MG/1; MG/1
1 TABLET ORAL DAILY
Status: DISCONTINUED | OUTPATIENT
Start: 2020-02-04 | End: 2020-02-06 | Stop reason: HOSPADM

## 2020-02-04 RX ORDER — ACETAMINOPHEN 500 MG
1000 TABLET ORAL ONCE
Status: COMPLETED | OUTPATIENT
Start: 2020-02-04 | End: 2020-02-04

## 2020-02-04 RX ORDER — PROMETHAZINE HYDROCHLORIDE 25 MG/1
25 TABLET ORAL ONCE AS NEEDED
Status: DISCONTINUED | OUTPATIENT
Start: 2020-02-04 | End: 2020-02-04 | Stop reason: HOSPADM

## 2020-02-04 RX ORDER — PROMETHAZINE HYDROCHLORIDE 25 MG/ML
12.5 INJECTION, SOLUTION INTRAMUSCULAR; INTRAVENOUS EVERY 4 HOURS PRN
Status: DISCONTINUED | OUTPATIENT
Start: 2020-02-04 | End: 2020-02-06 | Stop reason: HOSPADM

## 2020-02-04 RX ORDER — OXYCODONE HYDROCHLORIDE AND ACETAMINOPHEN 5; 325 MG/1; MG/1
1 TABLET ORAL EVERY 4 HOURS PRN
Status: DISCONTINUED | OUTPATIENT
Start: 2020-02-04 | End: 2020-02-06 | Stop reason: HOSPADM

## 2020-02-04 RX ORDER — LIDOCAINE HYDROCHLORIDE 10 MG/ML
0.5 INJECTION, SOLUTION EPIDURAL; INFILTRATION; INTRACAUDAL; PERINEURAL ONCE AS NEEDED
Status: DISCONTINUED | OUTPATIENT
Start: 2020-02-04 | End: 2020-02-04 | Stop reason: HOSPADM

## 2020-02-04 RX ORDER — SODIUM CHLORIDE 450 MG/100ML
100 INJECTION, SOLUTION INTRAVENOUS CONTINUOUS
Status: DISCONTINUED | OUTPATIENT
Start: 2020-02-04 | End: 2020-02-06 | Stop reason: HOSPADM

## 2020-02-04 RX ORDER — ACETAMINOPHEN 325 MG/1
650 TABLET ORAL ONCE AS NEEDED
Status: DISCONTINUED | OUTPATIENT
Start: 2020-02-04 | End: 2020-02-04 | Stop reason: HOSPADM

## 2020-02-04 RX ORDER — LIDOCAINE HYDROCHLORIDE 20 MG/ML
INJECTION, SOLUTION INFILTRATION; PERINEURAL AS NEEDED
Status: DISCONTINUED | OUTPATIENT
Start: 2020-02-04 | End: 2020-02-04 | Stop reason: SURG

## 2020-02-04 RX ORDER — TRANEXAMIC ACID 100 MG/ML
INJECTION, SOLUTION INTRAVENOUS AS NEEDED
Status: DISCONTINUED | OUTPATIENT
Start: 2020-02-04 | End: 2020-02-04 | Stop reason: SURG

## 2020-02-04 RX ORDER — PROPOFOL 10 MG/ML
VIAL (ML) INTRAVENOUS AS NEEDED
Status: DISCONTINUED | OUTPATIENT
Start: 2020-02-04 | End: 2020-02-04 | Stop reason: SURG

## 2020-02-04 RX ORDER — EPHEDRINE SULFATE 50 MG/ML
INJECTION, SOLUTION INTRAVENOUS AS NEEDED
Status: DISCONTINUED | OUTPATIENT
Start: 2020-02-04 | End: 2020-02-04 | Stop reason: SURG

## 2020-02-04 RX ORDER — PROMETHAZINE HYDROCHLORIDE 12.5 MG/1
12.5 TABLET ORAL EVERY 6 HOURS PRN
Status: DISCONTINUED | OUTPATIENT
Start: 2020-02-04 | End: 2020-02-06 | Stop reason: HOSPADM

## 2020-02-04 RX ORDER — DIAZEPAM 5 MG/1
5 TABLET ORAL EVERY 6 HOURS PRN
Status: DISCONTINUED | OUTPATIENT
Start: 2020-02-04 | End: 2020-02-06 | Stop reason: HOSPADM

## 2020-02-04 RX ORDER — HYDROCODONE BITARTRATE AND ACETAMINOPHEN 7.5; 325 MG/1; MG/1
1 TABLET ORAL ONCE AS NEEDED
Status: DISCONTINUED | OUTPATIENT
Start: 2020-02-04 | End: 2020-02-04 | Stop reason: HOSPADM

## 2020-02-04 RX ORDER — DIPHENHYDRAMINE HYDROCHLORIDE 50 MG/ML
12.5 INJECTION INTRAMUSCULAR; INTRAVENOUS
Status: DISCONTINUED | OUTPATIENT
Start: 2020-02-04 | End: 2020-02-04 | Stop reason: HOSPADM

## 2020-02-04 RX ORDER — FLUMAZENIL 0.1 MG/ML
0.2 INJECTION INTRAVENOUS AS NEEDED
Status: DISCONTINUED | OUTPATIENT
Start: 2020-02-04 | End: 2020-02-04 | Stop reason: HOSPADM

## 2020-02-04 RX ORDER — MIDAZOLAM HYDROCHLORIDE 1 MG/ML
2 INJECTION INTRAMUSCULAR; INTRAVENOUS
Status: DISCONTINUED | OUTPATIENT
Start: 2020-02-04 | End: 2020-02-04 | Stop reason: HOSPADM

## 2020-02-04 RX ORDER — LABETALOL HYDROCHLORIDE 5 MG/ML
5 INJECTION, SOLUTION INTRAVENOUS
Status: DISCONTINUED | OUTPATIENT
Start: 2020-02-04 | End: 2020-02-04 | Stop reason: HOSPADM

## 2020-02-04 RX ORDER — OXYCODONE HYDROCHLORIDE AND ACETAMINOPHEN 5; 325 MG/1; MG/1
1-2 TABLET ORAL EVERY 4 HOURS PRN
Qty: 84 TABLET | Refills: 0 | Status: SHIPPED | OUTPATIENT
Start: 2020-02-04 | End: 2020-02-06 | Stop reason: SDUPTHER

## 2020-02-04 RX ORDER — MIDAZOLAM HYDROCHLORIDE 1 MG/ML
1 INJECTION INTRAMUSCULAR; INTRAVENOUS
Status: DISCONTINUED | OUTPATIENT
Start: 2020-02-04 | End: 2020-02-04 | Stop reason: HOSPADM

## 2020-02-04 RX ORDER — CLINDAMYCIN PHOSPHATE 900 MG/50ML
900 INJECTION INTRAVENOUS ONCE
Status: COMPLETED | OUTPATIENT
Start: 2020-02-04 | End: 2020-02-04

## 2020-02-04 RX ORDER — SODIUM CHLORIDE 0.9 % (FLUSH) 0.9 %
3 SYRINGE (ML) INJECTION EVERY 12 HOURS SCHEDULED
Status: DISCONTINUED | OUTPATIENT
Start: 2020-02-04 | End: 2020-02-04 | Stop reason: HOSPADM

## 2020-02-04 RX ORDER — GLYBURIDE-METFORMIN HYDROCHLORIDE 5; 500 MG/1; MG/1
1 TABLET ORAL
Status: DISCONTINUED | OUTPATIENT
Start: 2020-02-04 | End: 2020-02-04 | Stop reason: CLARIF

## 2020-02-04 RX ORDER — OXYCODONE HYDROCHLORIDE AND ACETAMINOPHEN 5; 325 MG/1; MG/1
2 TABLET ORAL EVERY 4 HOURS PRN
Status: DISCONTINUED | OUTPATIENT
Start: 2020-02-04 | End: 2020-02-06 | Stop reason: HOSPADM

## 2020-02-04 RX ORDER — ATORVASTATIN CALCIUM 20 MG/1
20 TABLET, FILM COATED ORAL DAILY
Status: DISCONTINUED | OUTPATIENT
Start: 2020-02-04 | End: 2020-02-06 | Stop reason: HOSPADM

## 2020-02-04 RX ORDER — CELECOXIB 200 MG/1
200 CAPSULE ORAL ONCE
Status: COMPLETED | OUTPATIENT
Start: 2020-02-04 | End: 2020-02-04

## 2020-02-04 RX ORDER — ONDANSETRON 4 MG/1
4 TABLET, FILM COATED ORAL EVERY 6 HOURS PRN
Status: DISCONTINUED | OUTPATIENT
Start: 2020-02-04 | End: 2020-02-06 | Stop reason: HOSPADM

## 2020-02-04 RX ORDER — LOSARTAN POTASSIUM 50 MG/1
50 TABLET ORAL DAILY
Status: DISCONTINUED | OUTPATIENT
Start: 2020-02-04 | End: 2020-02-06 | Stop reason: HOSPADM

## 2020-02-04 RX ORDER — DOCUSATE SODIUM 100 MG/1
100 CAPSULE, LIQUID FILLED ORAL 2 TIMES DAILY PRN
Status: DISCONTINUED | OUTPATIENT
Start: 2020-02-04 | End: 2020-02-06 | Stop reason: HOSPADM

## 2020-02-04 RX ADMIN — NEOSTIGMINE METHYLSULFATE 2.5 MG: 1 INJECTION INTRAMUSCULAR; INTRAVENOUS; SUBCUTANEOUS at 10:27

## 2020-02-04 RX ADMIN — CELECOXIB 200 MG: 200 CAPSULE ORAL at 06:51

## 2020-02-04 RX ADMIN — PHENYLEPHRINE HYDROCHLORIDE 100 MCG: 10 INJECTION INTRAVENOUS at 09:21

## 2020-02-04 RX ADMIN — ROPIVACAINE HYDROCHLORIDE 60 ML: 2 INJECTION, SOLUTION EPIDURAL; INFILTRATION at 08:32

## 2020-02-04 RX ADMIN — PHENYLEPHRINE HYDROCHLORIDE 100 MCG: 10 INJECTION INTRAVENOUS at 10:15

## 2020-02-04 RX ADMIN — LOSARTAN POTASSIUM 50 MG: 50 TABLET, FILM COATED ORAL at 14:34

## 2020-02-04 RX ADMIN — FENTANYL CITRATE 50 MCG: 50 INJECTION, SOLUTION INTRAMUSCULAR; INTRAVENOUS at 11:12

## 2020-02-04 RX ADMIN — HYDROMORPHONE HYDROCHLORIDE 0.5 MG: 1 INJECTION, SOLUTION INTRAMUSCULAR; INTRAVENOUS; SUBCUTANEOUS at 11:31

## 2020-02-04 RX ADMIN — ONDANSETRON 4 MG: 2 INJECTION INTRAMUSCULAR; INTRAVENOUS at 14:29

## 2020-02-04 RX ADMIN — MUPIROCIN 1 APPLICATION: 20 OINTMENT TOPICAL at 21:43

## 2020-02-04 RX ADMIN — GLYCOPYRROLATE 0.4 MG: 0.2 INJECTION INTRAMUSCULAR; INTRAVENOUS at 10:27

## 2020-02-04 RX ADMIN — GLYCOPYRROLATE 0.2 MG: 0.2 INJECTION INTRAMUSCULAR; INTRAVENOUS at 08:41

## 2020-02-04 RX ADMIN — CLINDAMYCIN PHOSPHATE 900 MG: 18 INJECTION, SOLUTION INTRAMUSCULAR; INTRAVENOUS at 08:47

## 2020-02-04 RX ADMIN — FENTANYL CITRATE 50 MCG: 50 INJECTION, SOLUTION INTRAMUSCULAR; INTRAVENOUS at 11:20

## 2020-02-04 RX ADMIN — FAMOTIDINE 20 MG: 10 INJECTION INTRAVENOUS at 07:50

## 2020-02-04 RX ADMIN — FENTANYL CITRATE 50 MCG: 50 INJECTION INTRAMUSCULAR; INTRAVENOUS at 08:24

## 2020-02-04 RX ADMIN — PROPOFOL 200 MG: 10 INJECTION, EMULSION INTRAVENOUS at 08:43

## 2020-02-04 RX ADMIN — CLINDAMYCIN PHOSPHATE 900 MG: 900 INJECTION, SOLUTION INTRAVENOUS at 17:17

## 2020-02-04 RX ADMIN — TRANEXAMIC ACID 1000 MG: 100 INJECTION, SOLUTION INTRAVENOUS at 08:47

## 2020-02-04 RX ADMIN — EPHEDRINE SULFATE 10 MG: 50 INJECTION INTRAVENOUS at 09:16

## 2020-02-04 RX ADMIN — ROCURONIUM BROMIDE 10 MG: 10 INJECTION, SOLUTION INTRAVENOUS at 09:46

## 2020-02-04 RX ADMIN — MIDAZOLAM 1 MG: 1 INJECTION INTRAMUSCULAR; INTRAVENOUS at 08:29

## 2020-02-04 RX ADMIN — PHENYLEPHRINE HYDROCHLORIDE 100 MCG: 10 INJECTION INTRAVENOUS at 09:53

## 2020-02-04 RX ADMIN — SODIUM CHLORIDE, POTASSIUM CHLORIDE, SODIUM LACTATE AND CALCIUM CHLORIDE: 600; 310; 30; 20 INJECTION, SOLUTION INTRAVENOUS at 08:38

## 2020-02-04 RX ADMIN — METFORMIN HYDROCHLORIDE 1000 MG: 1000 TABLET ORAL at 17:48

## 2020-02-04 RX ADMIN — ACETAMINOPHEN 1000 MG: 500 TABLET, FILM COATED ORAL at 06:51

## 2020-02-04 RX ADMIN — ASPIRIN 325 MG: 325 TABLET, COATED ORAL at 17:48

## 2020-02-04 RX ADMIN — MIDAZOLAM 1 MG: 1 INJECTION INTRAMUSCULAR; INTRAVENOUS at 08:24

## 2020-02-04 RX ADMIN — FENTANYL CITRATE 50 MCG: 50 INJECTION INTRAMUSCULAR; INTRAVENOUS at 08:29

## 2020-02-04 RX ADMIN — PHENYLEPHRINE HYDROCHLORIDE 100 MCG: 10 INJECTION INTRAVENOUS at 09:32

## 2020-02-04 RX ADMIN — HYDROMORPHONE HYDROCHLORIDE 0.5 MG: 1 INJECTION, SOLUTION INTRAMUSCULAR; INTRAVENOUS; SUBCUTANEOUS at 12:03

## 2020-02-04 RX ADMIN — FENTANYL CITRATE 50 MCG: 50 INJECTION INTRAMUSCULAR; INTRAVENOUS at 09:05

## 2020-02-04 RX ADMIN — ROCURONIUM BROMIDE 40 MG: 10 INJECTION, SOLUTION INTRAVENOUS at 08:44

## 2020-02-04 RX ADMIN — INSULIN GLARGINE 32 UNITS: 100 INJECTION, SOLUTION SUBCUTANEOUS at 14:34

## 2020-02-04 RX ADMIN — SODIUM CHLORIDE 100 ML/HR: 4.5 INJECTION, SOLUTION INTRAVENOUS at 14:28

## 2020-02-04 RX ADMIN — ONDANSETRON HYDROCHLORIDE 4 MG: 2 SOLUTION INTRAMUSCULAR; INTRAVENOUS at 08:41

## 2020-02-04 RX ADMIN — DEXAMETHASONE SODIUM PHOSPHATE 8 MG: 10 INJECTION INTRAMUSCULAR; INTRAVENOUS at 09:00

## 2020-02-04 RX ADMIN — PROMETHAZINE HYDROCHLORIDE 12.5 MG: 25 INJECTION INTRAMUSCULAR; INTRAVENOUS at 17:11

## 2020-02-04 RX ADMIN — CLINDAMYCIN PHOSPHATE 900 MG: 18 INJECTION, SOLUTION INTRAMUSCULAR; INTRAVENOUS at 10:03

## 2020-02-04 RX ADMIN — SODIUM CHLORIDE, POTASSIUM CHLORIDE, SODIUM LACTATE AND CALCIUM CHLORIDE: 600; 310; 30; 20 INJECTION, SOLUTION INTRAVENOUS at 10:14

## 2020-02-04 RX ADMIN — LIDOCAINE HYDROCHLORIDE 40 MG: 20 INJECTION, SOLUTION INFILTRATION; PERINEURAL at 08:43

## 2020-02-04 NOTE — ANESTHESIA PREPROCEDURE EVALUATION
Anesthesia Evaluation     NPO Solid Status: > 8 hours             Airway   Mallampati: I  TM distance: >3 FB  Neck ROM: full  No difficulty expected  Dental    (+) partials    Pulmonary - normal exam   (+) pneumonia ,   Cardiovascular - normal exam    (+) hypertension 2 medications or greater, hyperlipidemia,     ROS comment: HOCM  Not severe.  FH of brothers with HOCM  Dr Pardo    Neuro/Psych  (+) psychiatric history Depression,     GI/Hepatic/Renal/Endo    (+) obesity, morbid obesity, GERD well controlled,  hepatitis B, liver disease, diabetes mellitus type 2, thyroid problem     Musculoskeletal     Abdominal    Substance History      OB/GYN          Other   arthritis,                      Anesthesia Plan    ASA 3     general with block     intravenous induction     Anesthetic plan, all risks, benefits, and alternatives have been provided, discussed and informed consent has been obtained with: patient.

## 2020-02-04 NOTE — PLAN OF CARE
Problem: Patient Care Overview  Goal: Plan of Care Review  Outcome: Ongoing (interventions implemented as appropriate)  Flowsheets (Taken 2/4/2020 3997)  Plan of Care Reviewed With: patient;daughter  Note:   Pt is a 63 yo F s/p L KELLY posterior precautions. Pt lives with  and family member in multi-level home with ramp entrance, can stay on main level upon DC.  is wc bound and she acts as primary caregiver but family has arranged for hired help upon DC. PLOF includes independent mobility and ADLs with intermittent use of SPC, owns FWW and elevated commodes. Pt is Jamaal for supine<>sit, CGA for all transfers, fair balance with no UE support, and CGA to ambulate 20 ft with FWW limited only due to nausea from surgery and residual numbness from block. Skilled PT needed to address above impairments. DC recs include home with HHPT.

## 2020-02-04 NOTE — ANESTHESIA PROCEDURE NOTES
Airway  Urgency: elective    Date/Time: 2/4/2020 8:45 AM  Airway not difficult    General Information and Staff    Patient location during procedure: OR  Anesthesiologist: Celia Wells MD  CRNA: Kristy Keyes CRNA    Indications and Patient Condition  Indications for airway management: airway protection    Preoxygenated: yes  Mask difficulty assessment: 1 - vent by mask    Final Airway Details  Final airway type: endotracheal airway      Successful airway: ETT  Cuffed: yes   Successful intubation technique: direct laryngoscopy  Facilitating devices/methods: intubating stylet  Endotracheal tube insertion site: oral  Blade: Mitchell  Blade size: 2  ETT size (mm): 7.0  Cormack-Lehane Classification: grade I - full view of glottis  Placement verified by: chest auscultation and capnometry   Cuff volume (mL): 7  Measured from: teeth  ETT/EBT  to teeth (cm): 19  Number of attempts at approach: 1  Assessment: lips, teeth, and gum same as pre-op and atraumatic intubation

## 2020-02-04 NOTE — DISCHARGE PLACEMENT REQUEST
"Tran Parra (64 y.o. Female)     Date of Birth Social Security Number Address Home Phone MRN    1955  [address] [id number] [phone] [id number]    [employer] Status          Wilfredo        Admission Date Admission Type Admitting Provider Attending Provider Department, Room/Bed    2/4/20 Elective Chris Do MD Goodin, Robert A, MD [address], P781/1    Discharge Date Discharge Disposition Discharge Destination                       Attending Provider:  Chris Do MD    Allergies:  Erythromycin, Codeine, Penicillins    Isolation:  None   Infection:  None   Code Status:  CPR    Ht:  154.9 cm (61\")   Wt:  78.4 kg (172 lb 12.8 oz)    Admission Cmt:  None   Principal Problem:  None                Active Insurance as of 2/4/2020     Primary Coverage     Payor Plan Insurance Group Employer/Plan Group    MEDICARE MEDICARE A & B      Payor Plan Address Payor Plan Phone Number Payor Plan Fax Number Effective Dates    [address] [phone]  9/1/2010 - None Entered    [facility] [id number]       Subscriber Name Subscriber Birth Date Member ID       TRAN PARRA 1955 [id number]           Secondary Coverage     Payor Plan Insurance Group Employer/Plan Group      [id number]     Payor Plan Address Payor Plan Phone Number Payor Plan Fax Number Effective Dates    [address] [phone]  7/29/2011 - None Entered    [place] [address]       Subscriber Name Subscriber Birth Date Member ID       TRAN PARRA 1955 [id number]                 Emergency Contacts      (Rel.) Home Phone Work Phone Mobile Phone    TroutmanLucrecia [place] (Daughter) -- -- [phone]    RUBI,JON (Son) -- -- [phone]              "

## 2020-02-04 NOTE — ANESTHESIA POSTPROCEDURE EVALUATION
"Patient: Tran Parra    Procedure Summary     Date:  02/04/20 Room / Location:  [facility] OR [address] [place] [address] MAIN OR    Anesthesia Start:  0837 Anesthesia Stop:  1045    Procedure:  TOTAL HIP ARTHROPLASTY (Left Hip) Diagnosis:      Surgeon:  Chris Do MD Provider:  Celia Wells MD    Anesthesia Type:  general with block ASA Status:  3          Anesthesia Type: general with block    Vitals  Vitals Value Taken Time   /74 2/4/2020 12:10 PM   Temp 36.7 °C (98 °F) 2/4/2020 10:40 AM   Pulse 74 2/4/2020 12:13 PM   Resp 16 2/4/2020 11:55 AM   SpO2 94 % 2/4/2020 12:13 PM   Vitals shown include unvalidated device data.        Post Anesthesia Care and Evaluation    Patient location during evaluation: bedside  Patient participation: complete - patient participated  Level of consciousness: awake and alert  Pain management: adequate  Airway patency: patent  Anesthetic complications: No anesthetic complications    Cardiovascular status: acceptable  Respiratory status: acceptable  Hydration status: acceptable    Comments: /54   Pulse 85   Temp 36.7 °C (98 °F) (Oral)   Resp 16   Ht 154.9 cm (61\")   Wt 78.4 kg (172 lb 12.8 oz)   SpO2 94%   BMI 32.65 kg/m²       "

## 2020-02-04 NOTE — H&P
"  Orthopaedic Surgery History and Physical    Patient Name:  Tran Parra  YOB: 1955  Age: 64 y.o.  Medical Records Number:  [id number]    Date of Admission:  2/4/2020  6:11 AM    Chief Complaint:  Primary osteoarthritis of left hip [M16.12]    Tran Parra is a 64 y.o. female who presents c/o severe left hip pain.  The pain has been on and off for many years, worsening recently to the point where the pain is becoming disabling. The pain is a constant dull ache with occasional sharp, stabbing pains.  The patient has failed conservative treatment and would like to proceed with left total hip arthroplasty.    There were no vitals taken for this visit.    Past Medical History:    Past Medical History:   Diagnosis Date   • Anemia    • Arthritis    • Bruising     LEFT ARM FROM FALL 5 DAYS DR SOSA OFFICE AWARE FROM PATIENT   • Diabetes mellitus (CMS/HCC)    • Disease of thyroid gland    • GERD (gastroesophageal reflux disease)    • Hepatitis B    • Hyperlipidemia    • Hypertension    • Left hip pain    • Low back pain    • Meniere disease    • Menopause    • Nonobstructive cardiomyopathy (CMS/HCC)     AS NOTED PER DR VASQUEZ NOTE 2/2019   • OAB (overactive bladder)    • Obesity    • Pneumonia    • Skin sore     MULTIPLE LEFT ARM,RIGHT LEG AND UPPER BACK. INSTRUCTED PT TO NOTIFY DR SOSA OFFICE IF THEY ARE NOT TOTALLY HEALED BEFORE HER SURGERY. PT STATED \"HIS OFFICE IS AWARE\".       Past Surgical History:   Past Surgical History:   Procedure Laterality Date   • APPENDECTOMY  1993   • CARDIAC CATHETERIZATION  03/25/2013    Left Heart Catheterization   • CARDIAC CATHETERIZATION  03/25/2013    Right Heart Catheterization   • CARDIAC CATHETERIZATION  03/25/2013    Coronary Arteriography   • CARDIAC CATHETERIZATION  03/25/2013    Left Ventriculography   • CARPAL TUNNEL RELEASE Right 2011   • CHOLECYSTECTOMY  2002   • DILATATION AND CURETTAGE  11/13/2003    [facility]/ DR. ZULEIKA PACK   • ENDOMETRIAL BIOPSY  " 04/20/2005    NORMAL   • OSTEOTOMY AND ULNAR SHORTENING Right 2009   • REPLACEMENT TOTAL KNEE Left 2013    DR. DO   • TRANSVAGINAL TAPING SUSPENSION  2004   • TUMOR REMOVAL      HAND- 2008; 2009       Social History:    Social History     Socioeconomic History   • Marital status:      Spouse name: RADHA   • Number of children: 3   • Years of education: Not on file   • Highest education level: Not on file   Occupational History     Employer: RETIRED   Tobacco Use   • Smoking status: Never Smoker   • Smokeless tobacco: Never Used   Substance and Sexual Activity   • Alcohol use: Not Currently   • Drug use: No   Social History Narrative    OB/GYN PATIENT SINCE 1988.       Family History:    Family History   Problem Relation Age of Onset   • Heart disease Father    • Breast cancer Mother 69   • Diabetes type II Mother    • Heart disease Brother    • Dilated cardiomyopathy Other    • Hypertrophic cardiomyopathy Other    • Heart disease Brother    • Hypertrophic cardiomyopathy Brother    • Heart disease Brother    • Malig Hyperthermia Neg Hx        Current Medications:  Scheduled Meds:  orthopedic surgery cocktail 1 (BH RUBY)  Injection Once     Continuous Infusions:   PRN Meds:.    Current Facility-Administered Medications:   •  ropivacaine (NAROPIN) 0.5 % 50 mL, Morphine (PF) 5 mg, ketorolac (TORADOL) 30 mg, EPINEPHrine (ADRENALIN) 0.3 mg in sodium chloride 0.9 % 101.8 mL, , Injection, Once, Chris Do MD    Allergies:    Allergies   Allergen Reactions   • Erythromycin Anaphylaxis   • Codeine Dizziness     ITCHING   • Penicillins Rash       Review of Systems:   HEENT: Patient denies any headaches, vision changes, change in hearing, or tinnitus, Patient denies any rhinorrhea,epistaxis, sinus pain, mouth or dental problems, sore throat or hoarseness, or dysphagia  Pulmonary: Patient denies any cough, congestion, SOA, or wheezing  Cardiovascular: Patient denies any chest pain, dyspnea, palpitations,  weakness, intolerance of exercise, varicosities, swelling of extremities, known murmur  Gastrointestinal:  Patient denies nausea, vomiting, diarrhea, constipation, loss  of appetite, change in appetite, dysphagia, gas, heartburn, melena, change in bowel habits, use of laxatives or other drugs to alter the function of the gastrointestinal tract.  Genital/Urinary: Patient denies dysuria, change in color of urine, change in frequency of urination, pain with urgency, incontinence, retention, or nocturia.  Musculoskeletal: Patient denies increased warmth; redness; or swelling of joints; limitation of function; deformity; crepitation: pain in a joint or an extremity, the neck, or the back, especially with movement.  Neurological: Patient denies dizziness, tremor, ataxia, difficulty in speaking, change in speech, paresthesia, loss of sensation, seizures, syncope, changes in memory.  Endocrine system: Patient denies tremors, palpitations, intolerance of heat or cold, polyuria, polydipsia, polyphagia, diaphoresis, exophthalmos, or goiter.  Psychological: Patient denies thoughts/plans or harming self or other; depression,  insomnia, night terrors, yohan, memory loss, disorientation.  Skin: Patient denies any bruising, rashes, discoloration, pruritus, wounds, ulcers, decubiti, changes in the hair or nails  Hematopoietic: Patient denies history of spontaneous or excessive bleeding, epistaxis, hematuria, melena, fatigue, enlarged or tender lymph nodes, pallor, history of anemia.      Physical Exam:  Awake, A&O x3, affect normal, no acute distress  Ambulating with a limp due to hip pain  Hip ROM is limited due to pain  No instability  Strength is 4/5 in the quad, hamstring, abduction and adduction  Cap refill is normal, Sensation intact    Card:  RR, HD Stable  Pulm:  Regular breathing, no S.O.A  Abd:  Soft, NT, ND    Lab Results (last 24 hours)     Procedure Component Value Units Date/Time    POC Glucose Once [[id number]]   (Abnormal) Collected:  02/04/20 0620    Specimen:  Blood Updated:  02/04/20 0623     Glucose 163 mg/dL     SCANNED - LABS [[id number]] Resulted:  02/04/20      Updated:  02/03/20 0938       [unfilled]    Xr Chest Pa & Lateral    Result Date: 1/28/2020  Narrative: XR CHEST PA AND LATERAL-, XR HIP W OR WO PELVIS 2-3 VIEW LEFT-  Clinical: Preop left hip surgery, hypertension  COMPARISON chest radiograph 9/10/2013  FINDINGS: Cardiac size stable, within normal limits. The mediastinum and arina have a satisfactory appearance. No effusion, vascular congestion or acute airspace disease demonstrated.  CONCLUSION: No active disease of the chest.  AP pelvis left hip findings: There is narrowing of the right hip joint, there is loss of the left hip joint with a bone-on-bone configuration and articular irregularity. There is subchondral cyst formation within the left femoral head. No indication of avascular necrosis, fracture, dislocation or bone lesion. Surrounding soft tissues have a satisfactory appearance.  CONCLUSION: Substantial left hip joint degeneration as described above.  This report was finalized on 1/28/2020 3:33 PM by Dr. Brant Pedersen M.D.      Mammo Screening Digital Tomosynthesis Bilateral With Cad    Result Date: 1/22/2020  Narrative: SCREENING MAMMOGRAM WITH R2 COMPUTERIZED ASSISTED DETECTION AND DIGITAL TOMOSYNTHESIS  HISTORY: Screening.  FINDINGS: Standard images and R2 computerized assisted detection were obtained followed by digital tomosynthesis. The breasts are fatty-replaced and symmetric. There are no findings to suggest malignancy.  CONCLUSION: Negative mammogram showing no change from 09/06/2018.  BI-RADS CATEGORY 1: Negative.  This report was finalized on 1/22/2020 11:34 AM by Dr. Justin Ceballos M.D.      Xr Hip With Or Without Pelvis 2 - 3 View Left    Result Date: 1/28/2020  Narrative: XR CHEST PA AND LATERAL-, XR HIP W OR WO PELVIS 2-3 VIEW LEFT-  Clinical: Preop left hip surgery, hypertension   COMPARISON chest radiograph 9/10/2013  FINDINGS: Cardiac size stable, within normal limits. The mediastinum and arina have a satisfactory appearance. No effusion, vascular congestion or acute airspace disease demonstrated.  CONCLUSION: No active disease of the chest.  AP pelvis left hip findings: There is narrowing of the right hip joint, there is loss of the left hip joint with a bone-on-bone configuration and articular irregularity. There is subchondral cyst formation within the left femoral head. No indication of avascular necrosis, fracture, dislocation or bone lesion. Surrounding soft tissues have a satisfactory appearance.  CONCLUSION: Substantial left hip joint degeneration as described above.  This report was finalized on 1/28/2020 3:33 PM by Dr. Brant Pedersen M.D.          Assessment:  End-stage Primary Left Hip Osteoarthritis    Plan:  Patient's pain is becoming disabling, despite extensive conservative treatment.  Radiographs reveal end-stage degenerative changes.  The risks of surgery, including, but not limited to, heart attack, stroke, dying, DVT, leg length inequality, nerve injury, vascular injury, stiffness and infection were discussed.  The alternatives and benefits were also discussed.  All questions answered and the patient wishes to proceed with left total hip arthroplasty.    Chris Roth PA-C  [facility]  [address] [place] [address]  [phone]    2/4/2020    CC: Omar Whitley MD, Chris Do MD

## 2020-02-04 NOTE — PROGRESS NOTES
Discharge Planning Assessment  [facility]     Patient Name: Tran Parra  MRN: [id number]  Today's Date: 2/4/2020    Admit Date: 2/4/2020    Discharge Needs Assessment     Row Name 02/04/20 1542       Living Environment    Lives With  child(to), adult;spouse    Current Living Arrangements  home/apartment/condo    Potentially Unsafe Housing Conditions  other (see comments) No concerns    Primary Care Provided by  self    Provides Primary Care For  spouse    Family Caregiver if Needed  child(to), adult    Quality of Family Relationships  helpful;involved;supportive    Able to Return to Prior Arrangements  yes       Resource/Environmental Concerns    Resource/Environmental Concerns  none    Transportation Concerns  car, none       Transition Planning    Patient/Family Anticipates Transition to  home with help/services    Patient/Family Anticipated Services at Transition  home health care    Transportation Anticipated  family or friend will provide       Discharge Needs Assessment    Readmission Within the Last 30 Days  no previous admission in last 30 days    Concerns to be Addressed  no discharge needs identified;denies needs/concerns at this time    Equipment Currently Used at Home  walker, rolling;cane, straight    Anticipated Changes Related to Illness  none    Equipment Needed After Discharge  none    Outpatient/Agency/Support Group Needs  homecare agency    Discharge Facility/Level of Care Needs  home with home health    Provided post acute provider list?  N/A    N/A Provider List Comment  Per bundle patient will have [facility] at discharge; patient confirms this is the plan    Patient's Choice of Community Agency(s)  [facility]     Current Discharge Risk  physical impairment        Discharge Plan     Row Name 02/04/20 1544       Plan    Plan  Home with [facility]    Patient/Family in Agreement with Plan  yes    Plan Comments  CCP met with the patient at bedside. CCP role  explained and discharge planning discussed. Face sheet verified and IMM noted. Patient's PCP is Dr. Whitley. Patient lives with her spouse and adult son. Patient lives in a multi-level home but bedroom and bathroom are on main level. Patient uses a cane at home and is independent with her ADLs. Patient denies subacute rehab history and has used [facility] in the past. Patient plans to return home at discharge and use [facility]. Per Dr. Do's office, patient will use [facility] at discharge; patient confirms this is the plan. CCP placed referral in [facility]. Patient's pharmacy is [facility] in [place]. Patient is enrolled in meds to [facility]. Patient denied discharge needs. CCP to follow to assist with discharge home with [facility] and to assist should any discharge needs arise. Alexus Grant CSW        Destination      Coordination has not been started for this encounter.      Durable Medical Equipment      Coordination has not been started for this encounter.      Dialysis/Infusion      Coordination has not been started for this encounter.      Home Medical Care      Service Provider Request Status Selected Services Address Phone Number Fax Number    [facility] Pending - Request Sent N/A [phone] [facility] [address] [address] [phone]      Therapy      Coordination has not been started for this encounter.      Community Resources      Coordination has not been started for this encounter.          Demographic Summary     Row Name 02/04/20 1541       General Information    Admission Type  inpatient    Arrived From  home    Required Notices Provided  Important Message from Medicare    Referral Source  admission list    Reason for Consult  discharge planning    Preferred Language  English     Used During This Interaction  no        Functional Status     Row Name 02/04/20 1541       Functional Status    Usual Activity  Tolerance  good    Current Activity Tolerance  moderate       Functional Status, IADL    Medications  independent    Meal Preparation  independent    Housekeeping  independent    Laundry  independent    Shopping  independent       Mental Status    General Appearance WDL  WDL       Mental Status Summary    Recent Changes in Mental Status/Cognitive Functioning  no changes                Alexus Grant

## 2020-02-04 NOTE — THERAPY EVALUATION
"Patient Name: Tran Parra  : 1955    MRN: [id number]                              Today's Date: 2020       Admit Date: 2020    Visit Dx:     ICD-10-CM ICD-9-CM   1. Primary osteoarthritis of left hip M16.12 715.15     Patient Active Problem List   Diagnosis   • Primary osteoarthritis of left hip     Past Medical History:   Diagnosis Date   • Anemia    • Arthritis    • Bruising     LEFT ARM FROM FALL 5 DAYS DR SOSA OFFICE AWARE FROM PATIENT   • Diabetes mellitus (CMS/HCC)    • Disease of thyroid gland    • GERD (gastroesophageal reflux disease)    • Hepatitis B    • Hyperlipidemia    • Hypertension    • Left hip pain    • Low back pain    • Meniere disease    • Menopause    • Nonobstructive cardiomyopathy (CMS/HCC)     AS NOTED PER DR VASQUEZ NOTE 2019   • OAB (overactive bladder)    • Obesity    • Pneumonia    • Skin sore     MULTIPLE LEFT ARM,RIGHT LEG AND UPPER BACK. INSTRUCTED PT TO NOTIFY DR SOSA OFFICE IF THEY ARE NOT TOTALLY HEALED BEFORE HER SURGERY. PT STATED \"HIS OFFICE IS AWARE\".     Past Surgical History:   Procedure Laterality Date   • APPENDECTOMY     • CARDIAC CATHETERIZATION  2013    Left Heart Catheterization   • CARDIAC CATHETERIZATION  2013    Right Heart Catheterization   • CARDIAC CATHETERIZATION  2013    Coronary Arteriography   • CARDIAC CATHETERIZATION  2013    Left Ventriculography   • CARPAL TUNNEL RELEASE Right    • CHOLECYSTECTOMY     • DILATATION AND CURETTAGE  2003    [facility]/ DR. ZULEIKA PACK   • ENDOMETRIAL BIOPSY  2005    NORMAL   • OSTEOTOMY AND ULNAR SHORTENING Right    • REPLACEMENT TOTAL KNEE Left     DR. SOSA   • TRANSVAGINAL TAPING SUSPENSION     • TUMOR REMOVAL      HAND- ;      General Information     Row Name 20 1434          PT Evaluation Time/Intention    Document Type  evaluation  -AE     Mode of Treatment  physical therapy  -AE     Row Name 20 1434          General " Information    Patient Profile Reviewed?  yes  -AE     Prior Level of Function  independent:  -AE     Row Name 02/04/20 1434          Relationship/Environment    Lives With  child(to), adult;spouse  -AE     Row Name 02/04/20 1434          Resource/Environmental Concerns    Current Living Arrangements  home/apartment/condo  -AE     Row Name 02/04/20 1434          Home Main Entrance    Number of Stairs, Main Entrance  none  -AE     Row Name 02/04/20 1434          Stairs Within Home, Primary    Number of Stairs, Within Home, Primary  none  -AE     Row Name 02/04/20 1434          Cognitive Assessment/Intervention- PT/OT    Orientation Status (Cognition)  oriented x 3  -AE     Row Name 02/04/20 1434          Safety Issues, Functional Mobility    Safety Issues Affecting Function (Mobility)  safety precautions follow-through/compliance  -AE     Impairments Affecting Function (Mobility)  balance;strength;motor control  -AE       User Key  (r) = Recorded By, (t) = Taken By, (c) = Cosigned By    Initials Name Provider Type    AE Sofia Looney, PT Physical Therapist        Mobility     Row Name 02/04/20 1434          Bed Mobility Assessment/Treatment    Bed Mobility Assessment/Treatment  supine-sit  -AE     Supine-Sit Alligator (Bed Mobility)  minimum assist (75% patient effort);1 person assist  -AE     Assistive Device (Bed Mobility)  bed rails  -AE     Row Name 02/04/20 1434          Transfer Assessment/Treatment    Comment (Transfers)  pt is CGA for all transfers with FWW  -AE     Row Name 02/04/20 1434          Bed-Chair Transfer    Bed-Chair Alligator (Transfers)  contact guard  -AE     Assistive Device (Bed-Chair Transfers)  walker, front-wheeled  -AE     Row Name 02/04/20 1434          Sit-Stand Transfer    Sit-Stand Alligator (Transfers)  contact guard  -AE     Assistive Device (Sit-Stand Transfers)  walker, front-wheeled  -AE     Row Name 02/04/20 1434          Gait/Stairs Assessment/Training     Gait/Stairs Assessment/Training  gait/ambulation independence;gait/ambulation assistive device  -AE     [place] Level (Gait)  contact guard  -AE     Assistive Device (Gait)  walker, front-wheeled  -AE     Distance in Feet (Gait)  20 ft  -AE     Pattern (Gait)  step-through  -AE     Deviations/Abnormal Patterns (Gait)  antalgic;scot decreased;stride length decreased  -AE     Row Name 02/04/20 1434          Mobility Assessment/Intervention    Extremity Weight-bearing Status  left lower extremity  -AE     Left Lower Extremity (Weight-bearing Status)  weight-bearing as tolerated (WBAT)  -AE       User Key  (r) = Recorded By, (t) = Taken By, (c) = Cosigned By    Initials Name Provider Type    AE Sofia Looney, PT Physical Therapist        Obj/Interventions     Row Name 02/04/20 1435          General ROM    GENERAL ROM COMMENTS  BLE WFL (L hip maintained within surgical precautions)  -AE     Row Name 02/04/20 1435          MMT (Manual Muscle Testing)    General MMT Comments  L hip 3/5, RLE 4+/5  -AE     Row Name 02/04/20 1435          Therapeutic Exercise    Comment (Therapeutic Exercise)  KELLY x 5  -AE       User Key  (r) = Recorded By, (t) = Taken By, (c) = Cosigned By    Initials Name Provider Type    AE Sofia Looney, PT Physical Therapist        Goals/Plan     Row Name 02/04/20 1436          Bed Mobility Goal 1 (PT)    Activity/Assistive Device (Bed Mobility Goal 1, PT)  bed mobility activities, all  -AE     [place] Level/Cues Needed (Bed Mobility Goal 1, PT)  supervision required  -AE     Time Frame (Bed Mobility Goal 1, PT)  1 week  -AE     Progress/Outcomes (Bed Mobility Goal 1, PT)  continuing progress toward goal  -AE     Row Name 02/04/20 1436          Transfer Goal 1 (PT)    Activity/Assistive Device (Transfer Goal 1, PT)  transfers, all  -AE     [place] Level/Cues Needed (Transfer Goal 1, PT)  supervision required  -AE     Time Frame (Transfer Goal 1, PT)  1 week  -AE      Progress/Outcome (Transfer Goal 1, PT)  continuing progress toward goal  -AE     Row Name 02/04/20 1436          Gait Training Goal 1 (PT)    Activity/Assistive Device (Gait Training Goal 1, PT)  gait (walking locomotion);assistive device use  -AE     [place] Level (Gait Training Goal 1, PT)  supervision required  -AE     Distance (Gait Goal 1, PT)  100 ft  -AE     Time Frame (Gait Training Goal 1, PT)  1 week  -AE     Progress/Outcome (Gait Training Goal 1, PT)  continuing progress toward goal  -AE       User Key  (r) = Recorded By, (t) = Taken By, (c) = Cosigned By    Initials Name Provider Type    AE Sofia Looney, PT Physical Therapist        Clinical Impression     Row Name 02/04/20 1435          Pain Assessment    Additional Documentation  Pain Scale: Numbers Pre/Post-Treatment (Group)  -AE     Row Name 02/04/20 1435          Pain Scale: Numbers Pre/Post-Treatment    Pain Scale: Numbers, Pretreatment  0/10 - no pain  -AE     Pain Scale: Numbers, Post-Treatment  2/10  -AE     Pain Location - Side  Left  -AE     Pain Location - Orientation  incisional  -AE     Pain Location  hip  -AE     Pain Intervention(s)  Repositioned;Cold applied;Rest  -AE     Row Name 02/04/20 1438          Plan of Care Review    Plan of Care Reviewed With  patient;daughter  -AE     Row Name 02/04/20 1435          Physical Therapy Clinical Impression    Patient/Family Goals Statement (PT Clinical Impression)  Pt wants to return home with HHPT to follow  -AE     Criteria for Skilled Interventions Met (PT Clinical Impression)  yes;treatment indicated  -AE     Rehab Potential (PT Clinical Summary)  good, to achieve stated therapy goals  -AE     Row Name 02/04/20 1437          Positioning and Restraints    Pre-Treatment Position  in bed  -AE     Post Treatment Position  chair  -AE     In Chair  reclined;call light within reach;encouraged to call for assist;exit alarm on;with nsg;with family/caregiver  -AE       User Key  (r) =  Recorded By, (t) = Taken By, (c) = Cosigned By    Initials Name Provider Type    Sofia Link PT Physical Therapist        Outcome Measures     Row Name 02/04/20 1438          How much help from another person do you currently need...    Turning from your back to your side while in flat bed without using bedrails?  3  -AE     Moving from lying on back to sitting on the side of a flat bed without bedrails?  3  -AE     Moving to and from a bed to a chair (including a wheelchair)?  3  -AE     Standing up from a chair using your arms (e.g., wheelchair, bedside chair)?  3  -AE     Climbing 3-5 steps with a railing?  2  -AE     To walk in hospital room?  3  -AE     AM-PAC 6 Clicks Score (PT)  17  -AE     Row Name 02/04/20 1438          Functional Assessment    Outcome Measure Options  AM-PAC 6 Clicks Basic Mobility (PT)  -AE       User Key  (r) = Recorded By, (t) = Taken By, (c) = Cosigned By    Initials Name Provider Type    Sofia Link PT Physical Therapist        Physical Therapy Education                 Title: PT OT SLP Therapies (Done)     Topic: Physical Therapy (Done)     Point: Mobility training (Done)     Description:   Instruct learner(s) on safety and technique for assisting patient out of bed, chair or wheelchair.  Instruct in the proper use of assistive devices, such as walker, crutches, cane or brace.              Patient Friendly Description:   It's important to get you on your feet again, but we need to do so in a way that is safe for you. Falling has serious consequences, and your personal safety is the most important thing of all.        When it's time to get out of bed, one of us or a family member will sit next to you on the bed to give you support.     If your doctor or nurse tells you to use a walker, crutches, a cane, or a brace, be sure you use it every time you get out of bed, even if you think you don't need it.    Learning Progress Summary           Patient Acceptance, E,TB,  VU,NR by AE at 2/4/2020 1438   Family Acceptance, E,TB, VU,NR by AE at 2/4/2020 1438                   Point: Home exercise program (Done)     Description:   Instruct learner(s) on appropriate technique for monitoring, assisting and/or progressing patient with therapeutic exercises and activities.              Learning Progress Summary           Patient Acceptance, E,TB, VU,NR by AE at 2/4/2020 1438   Family Acceptance, E,TB, VU,NR by AE at 2/4/2020 1438                   Point: Body mechanics (Done)     Description:   Instruct learner(s) on proper positioning and spine alignment for patient and/or caregiver during mobility tasks and/or exercises.              Learning Progress Summary           Patient Acceptance, E,TB, VU,NR by AE at 2/4/2020 1438   Family Acceptance, E,TB, VU,NR by AE at 2/4/2020 1438                   Point: Precautions (Done)     Description:   Instruct learner(s) on prescribed precautions during mobility and gait tasks              Learning Progress Summary           Patient Acceptance, E,TB, VU,NR by AE at 2/4/2020 1438   Family Acceptance, E,TB, VU,NR by AE at 2/4/2020 1438                               User Key     Initials Effective Dates Name Provider Type Discipline    AE 09/04/19 -  Sofia Looney, PT Physical Therapist PT              PT Recommendation and Plan  Planned Therapy Interventions (PT Eval): balance training, bed mobility training, gait training, home exercise program, patient/family education, neuromuscular re-education, motor coordination training, postural re-education, ROM (range of motion), stair training, strengthening, stretching, transfer training  Outcome Summary/Treatment Plan (PT)  Anticipated Discharge Disposition (PT): home, home with assist, home with home health  Plan of Care Reviewed With: patient, daughter     Time Calculation:   PT Charges     Row Name 02/04/20 1441             Time Calculation    Start Time  1400  -AE      Stop Time  1430  -AE      Time  Calculation (min)  30 min  -AE      PT Received On  02/04/20  -AE      PT - Next Appointment  02/05/20  -AE      PT Goal Re-Cert Due Date  02/11/20  -AE         Time Calculation- PT    Total Timed Code Minutes- PT  25 minute(s)  -AE        User Key  (r) = Recorded By, (t) = Taken By, (c) = Cosigned By    Initials Name Provider Type    AE Sofia Looney, PT Physical Therapist        Therapy Charges for Today     Code Description Service Date Service Provider Modifiers Qty    16137546190 HC PT EVAL LOW COMPLEXITY 2 2/4/2020 Sofia Looney, PT GP 1    74216639482 HC PT THER PROC EA 15 MIN 2/4/2020 Sofia Looney, PT GP 1          PT G-Codes  Outcome Measure Options: AM-PAC 6 Clicks Basic Mobility (PT)  AM-PAC 6 Clicks Score (PT): 17    Sofia Looney PT  2/4/2020

## 2020-02-04 NOTE — OP NOTE
Orthopaedic Surgery Operative Note    Patient Name:  Tran Parra  YOB: 1955  Age: 64 y.o.  Medical Records Number:  [id number]    Date of Procedure:  2/4/2020    Pre-operative Diagnosis:  Primary Osteoarthritis Left HIp    Post-operative Diagnosis:  Primary Osteoarthritis Left Hip    Procedure Performed:  Left Total Hip Arthroplasty                                          Layered Closure    Implants:  Biomet 46 mm Trispike Acetabular Shell, 6 mm Standard Offset Taperloc Complete Stem, 32 mm +3 Lateralized High-wall Polyethylene liner, -6 32 mm Ceramic Head    Surgeon:  Chris Do M.D.    Assistant: Wilma Ortiz (who was present during the critical portions of the case, thereby decreasing operative time and patient morbidity)    Anesthetic Type:  General    Estimated Blood Loss:  250cc's    Specimens: * No orders in the log *    No Complications      Indications for Procedure:  Tran Parra is a 64 y.o. female suffering from end stage degenerative changes in the left hip.  The patients pain is becoming disabling, despite extensive conservative care, including NSAIDS, activity modification and therapy. The risks, benefits and alternatives were discussed and the patient wishes to proceed with left total hip arthroplasty.      Procedure Performed:    After informed consent was obtained, the correct patient identified, the correct operative side marked by the operative surgeon, and pre-operative IV antibiotics given, the patient was taken to the operating room and placed supine on the operating table.  After general anesthesia was induced, a surgical time out was performed and 1 gm of IV Tranxemic Acid was given, the patient was placed in the right lateral decubitus position and the left lower extremity was prepped with chloraprep and draped in a sterile fashion.    An incision was made overlying the left hip.  We sharply dissected down to expose the fascia over the gluteus  anshu and the Iliotibial band.  We split the fascia in line with the skin incision and placed a Charnley retractor carefully to avoid damage to the Sciatic Nerve.  We then began our posterior approach to the hip by identifying the short external rotators and tagging these with a #5 Tevdek and releasing them from their insertion on the femur.  We then identified the posterior capsule, released the capsule from it's insertion on the femur and tagged the capsule proximally and distally with a #5 Tevdek.  We then dislocated the hip and made our neck cut roughly 2-3 mm proximal to the lesser trochanter. We measured the head to be 44 mm and our neck cut to be 51 mm.      We then gained exposure of the acetabulum, removed the pulvinar and labrum, then sequentially  reamed from a 36 mm reamer up to a 46 mm reamer.  After reaming, we had excellent interference fit and a nice bleeding, bony bed for our acetabular component.  We copiously irrigated the acetabulum, then impacted our permanent acetabular component into place We assured we were completely seated by checking through the apical hole, we placed two 6.5 mm cancellous screws and then we placed our permanent polyethylene liner.    We then turned our attention to the femur where we cleared the trochanteric fossa of soft tissue.  We entered the canal with a box chisel, hand held reamer and lateralizing reamer.  We then sequentially broached from a 4 mm broach up to a 6 mm broach.  We trialed with both a standard neck and high offset neck with the -6 32 mm head and had excellent stability, range of motion and leg length equality with the std offset neck.  An intraoperative radiograph revealed excellent implant position and alignment.  We removed our trials and copiously irrigated the hip.  We then placed our permanent 6 std offset stem and trialed again with a -6 and -3 32 mm heads.  The -6 32 mm head gave us the best range of motion, stability and leg length equality.   We removed the trials, copiously irrigated the hip, cleaned and dried the trunion and then we impacted our permanent head.  We then reduced the hip and began our layered closure after placing our local anesthetic and re-dosing IV antibiotics.  We closed the short external rotators and posterior capsule through two drill holes in the greater trochanter and a soft tissue stitch in the Gluteus Medius.  We closed the deep fascia with a #1 Vicryl, the deep subcutaneous tissue with a #1 Vicryl, the subcutaneous tissue with 2-0 Vicryl and the skin with 3-0 Monocryl and Dermabond.  We placed a sterile dressing of Xeroform and an [place] dressing.  The patient was awakened from general anesthesia and taken to the recovery room in stable condition.    The patient will be started on Aspirin 325 mg mg twice daily for DVT prophylaxis.  IV antibiotics will be discontinued within 24 hours of surgery.  Immediately prior to surgery, there were no acute Thromboembolic nor Cardiovascular risk factors.  An updated Medical Reconciliation form is on the chart.    Chris Roth PA-C  [facility]  [address] [place], [place] [address] [phone]    2/4/2020    CC: @PCP, Chris Do MD

## 2020-02-04 NOTE — ANESTHESIA PROCEDURE NOTES
Peripheral Block      Patient location during procedure: holding area  Start time: 2/4/2020 8:30 AM  Stop time: 2/4/2020 8:33 AM  Reason for block: at surgeon's request and post-op pain management  Performed by  Anesthesiologist: Celia Wells MD  Preanesthetic Checklist  Completed: patient identified, site marked, surgical consent, pre-op evaluation, timeout performed, IV checked, risks and benefits discussed and monitors and equipment checked  Prep:  Sterile barriers:gloves  Prep: ChloraPrep  Patient monitoring: continuous pulse oximetry, blood pressure monitoring and EKG  Procedure  Sedation:yes  Performed under: PNB  Guidance:ultrasound guided  Images:still images not obtained    Laterality:left  Block Type:fascia iliaca compartment    Needle Type:Tuohy      Medications Used: ropivacaine (NAROPIN) 0.2 % injection, 60 mL      Post Assessment  Injection Assessment: negative aspiration for heme, no paresthesia on injection and incremental injection  Patient Tolerance:comfortable throughout block  Complications:no

## 2020-02-04 NOTE — PLAN OF CARE
Pt POD 0 L KELLY. Ambulated within 4 hours of leaving PACU. VSS. No c/o pain. Mild nausea. Island drsg C/D/I. Due to void by 1830. Plans for home with HH at D/C.

## 2020-02-05 LAB
ANION GAP SERPL CALCULATED.3IONS-SCNC: 11.6 MMOL/L (ref 5–15)
BUN BLD-MCNC: 14 MG/DL (ref 8–23)
BUN/CREAT SERPL: 14.9 (ref 7–25)
CALCIUM SPEC-SCNC: 8 MG/DL (ref 8.6–10.5)
CHLORIDE SERPL-SCNC: 99 MMOL/L (ref 98–107)
CO2 SERPL-SCNC: 21.4 MMOL/L (ref 22–29)
CREAT BLD-MCNC: 0.94 MG/DL (ref 0.57–1)
DEPRECATED RDW RBC AUTO: 39.4 FL (ref 37–54)
ERYTHROCYTE [DISTWIDTH] IN BLOOD BY AUTOMATED COUNT: 14.6 % (ref 12.3–15.4)
GFR SERPL CREATININE-BSD FRML MDRD: 60 ML/MIN/1.73
GLUCOSE BLD-MCNC: 121 MG/DL (ref 65–99)
GLUCOSE BLDC GLUCOMTR-MCNC: 110 MG/DL (ref 70–130)
GLUCOSE BLDC GLUCOMTR-MCNC: 129 MG/DL (ref 70–130)
GLUCOSE BLDC GLUCOMTR-MCNC: 162 MG/DL (ref 70–130)
GLUCOSE BLDC GLUCOMTR-MCNC: 165 MG/DL (ref 70–130)
HCT VFR BLD AUTO: 27.2 % (ref 34–46.6)
HGB BLD-MCNC: 8.9 G/DL (ref 12–15.9)
MCH RBC QN AUTO: 24.5 PG (ref 26.6–33)
MCHC RBC AUTO-ENTMCNC: 32.7 G/DL (ref 31.5–35.7)
MCV RBC AUTO: 74.7 FL (ref 79–97)
PLATELET # BLD AUTO: 208 10*3/MM3 (ref 140–450)
PMV BLD AUTO: 9.6 FL (ref 6–12)
POTASSIUM BLD-SCNC: 4.3 MMOL/L (ref 3.5–5.2)
RBC # BLD AUTO: 3.64 10*6/MM3 (ref 3.77–5.28)
SODIUM BLD-SCNC: 132 MMOL/L (ref 136–145)
WBC NRBC COR # BLD: 6.61 10*3/MM3 (ref 3.4–10.8)

## 2020-02-05 PROCEDURE — 80048 BASIC METABOLIC PNL TOTAL CA: CPT | Performed by: ORTHOPAEDIC SURGERY

## 2020-02-05 PROCEDURE — 97150 GROUP THERAPEUTIC PROCEDURES: CPT

## 2020-02-05 PROCEDURE — 82962 GLUCOSE BLOOD TEST: CPT

## 2020-02-05 PROCEDURE — 97110 THERAPEUTIC EXERCISES: CPT

## 2020-02-05 PROCEDURE — 85027 COMPLETE CBC AUTOMATED: CPT | Performed by: ORTHOPAEDIC SURGERY

## 2020-02-05 PROCEDURE — 63710000001 INSULIN GLARGINE PER 5 UNITS: Performed by: ORTHOPAEDIC SURGERY

## 2020-02-05 RX ORDER — PSEUDOEPHEDRINE HCL 30 MG
100 TABLET ORAL 2 TIMES DAILY PRN
Qty: 30 EACH | Refills: 1 | Status: SHIPPED | OUTPATIENT
Start: 2020-02-05 | End: 2022-02-19

## 2020-02-05 RX ADMIN — ONDANSETRON HYDROCHLORIDE 4 MG: 4 TABLET, FILM COATED ORAL at 17:01

## 2020-02-05 RX ADMIN — SODIUM CHLORIDE, PRESERVATIVE FREE 3 ML: 5 INJECTION INTRAVENOUS at 08:29

## 2020-02-05 RX ADMIN — MUPIROCIN 1 APPLICATION: 20 OINTMENT TOPICAL at 08:24

## 2020-02-05 RX ADMIN — OXYCODONE AND ACETAMINOPHEN 1 TABLET: 5; 325 TABLET ORAL at 21:25

## 2020-02-05 RX ADMIN — METFORMIN HYDROCHLORIDE 1000 MG: 1000 TABLET ORAL at 08:24

## 2020-02-05 RX ADMIN — FERROUS SULFATE TAB 325 MG (65 MG ELEMENTAL FE) 325 MG: 325 (65 FE) TAB at 08:24

## 2020-02-05 RX ADMIN — PANTOPRAZOLE SODIUM 40 MG: 40 TABLET, DELAYED RELEASE ORAL at 06:22

## 2020-02-05 RX ADMIN — CLINDAMYCIN PHOSPHATE 900 MG: 900 INJECTION, SOLUTION INTRAVENOUS at 02:33

## 2020-02-05 RX ADMIN — ATORVASTATIN CALCIUM 20 MG: 20 TABLET, FILM COATED ORAL at 20:32

## 2020-02-05 RX ADMIN — INSULIN GLARGINE 32 UNITS: 100 INJECTION, SOLUTION SUBCUTANEOUS at 08:24

## 2020-02-05 RX ADMIN — OXYCODONE HYDROCHLORIDE AND ACETAMINOPHEN 2 TABLET: 5; 325 TABLET ORAL at 08:25

## 2020-02-05 RX ADMIN — MUPIROCIN 1 APPLICATION: 20 OINTMENT TOPICAL at 20:32

## 2020-02-05 RX ADMIN — ASPIRIN 325 MG: 325 TABLET, COATED ORAL at 08:24

## 2020-02-05 RX ADMIN — OXYCODONE AND ACETAMINOPHEN 1 TABLET: 5; 325 TABLET ORAL at 03:53

## 2020-02-05 RX ADMIN — SODIUM CHLORIDE, PRESERVATIVE FREE 3 ML: 5 INJECTION INTRAVENOUS at 20:32

## 2020-02-05 RX ADMIN — OXYCODONE AND ACETAMINOPHEN 1 TABLET: 5; 325 TABLET ORAL at 21:22

## 2020-02-05 RX ADMIN — LEVOTHYROXINE SODIUM 112 MCG: 112 TABLET ORAL at 06:29

## 2020-02-05 RX ADMIN — ASPIRIN 325 MG: 325 TABLET, COATED ORAL at 17:01

## 2020-02-05 RX ADMIN — OXYCODONE HYDROCHLORIDE AND ACETAMINOPHEN 2 TABLET: 5; 325 TABLET ORAL at 12:38

## 2020-02-05 RX ADMIN — METFORMIN HYDROCHLORIDE 1000 MG: 1000 TABLET ORAL at 17:01

## 2020-02-05 RX ADMIN — SENNOSIDES AND DOCUSATE SODIUM 2 TABLET: 8.6; 5 TABLET ORAL at 11:57

## 2020-02-05 RX ADMIN — DULOXETINE HYDROCHLORIDE 60 MG: 60 CAPSULE, DELAYED RELEASE ORAL at 08:24

## 2020-02-05 RX ADMIN — OXYCODONE AND ACETAMINOPHEN 1 TABLET: 5; 325 TABLET ORAL at 17:01

## 2020-02-05 NOTE — PLAN OF CARE
Problem: Patient Care Overview  Goal: Plan of Care Review  Note:   Pt is a post op day 2 of a left total his. Prosterior approach. Pt has ambulated to the  bathroom with an an assist x1. Optifoam dressing in place. Pt was having some nausea and vomiting in the morning, which has since resolved. Pt educated on the importance of monitoring blood sugars related to comorbidity of Diabetes. Pt voiced understanding ? Pt is resting at this time. Will continue to monitor.

## 2020-02-05 NOTE — PLAN OF CARE
Problem: Patient Care Overview  Goal: Plan of Care Review  Outcome: Ongoing (interventions implemented as appropriate)  Flowsheets (Taken 2/5/2020 1314)  Plan of Care Reviewed With: patient (dtr came at end of session)  Outcome Summary: pt amb w/few cues req, but fatigued quickly limiting distance; pt is concerned about going home too soon as she is caregiver for husb, has hip prec to follow, and fam works; plans home w/HH THURS, dtr will need to  after work hrs

## 2020-02-05 NOTE — THERAPY TREATMENT NOTE
Acute Care - Physical Therapy Treatment Note  [facility]     Patient Name: Tran Parra  : 1955  MRN: [id number]  Today's Date: 2020             Admit Date: 2020    Visit Dx:    ICD-10-CM ICD-9-CM   1. Primary osteoarthritis of left hip M16.12 715.15     Patient Active Problem List   Diagnosis   • Primary osteoarthritis of left hip       Therapy Treatment    Rehabilitation Treatment Summary     Row Name 20 1555 20 0930          Treatment Time/Intention    Discipline  physical therapy assistant  -  physical therapy assistant  -     Document Type  therapy note (daily note)  -  therapy note (daily note)  -     Subjective Information  complains of;weakness;fatigue;pain  -  complains of;weakness;fatigue;pain  -     Mode of Treatment  group therapy;physical therapy  -  group therapy;physical therapy  -     Care Plan Review  patient/other agree to care plan  -  patient/other agree to care plan  -     Care Plan Review, Other Participant(s)  daughter  -  daughter  -     Comment  --  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, hip prec, fam works  -     Existing Precautions/Restrictions  fall;hip, posterior;left  -  fall;hip, posterior;left  -     Recorded by [FRANKY] Jeny Mitchell PTA 20 1606 [FRANKY] Jeny Mitchell PTA 20 1314     Row Name 20 1555 20 0930          Bed Mobility Assessment/Treatment    Supine-Sit [place] (Bed Mobility)  contact guard;verbal cues  -  --     Sit-Supine [place] (Bed Mobility)  minimum assist (75% patient effort);verbal cues  -  --     Bed Mobility, Safety Issues  decreased use of legs for bridging/pushing  -  --     Comment (Bed Mobility)  dtr present for educ on technique and hip prec  -  educ in pm  -JM     Recorded by [FRANKY] Jeny Mitchell PTA 20 1606 [FRANKY] Jeny Mitchell PTA 20 1314     Row Name 20 1555 20 0930          Sit-Stand Transfer    Sit-Stand  [place] (Transfers)  contact guard;verbal cues cues for hand placement  -  contact guard;verbal cues cues for hand placement  -     Assistive Device (Sit-Stand Transfers)  walker, front-wheeled  -  walker, front-wheeled  -     Recorded by [] Jeny Mitchell, [facility] 02/05/20 1606 [JM] Jeny Mitchell, [facility] [place] 02/05/20 1314     Row Name 02/05/20 1555 02/05/20 0930          Gait/Stairs Assessment/Training    [place] Level (Gait)  contact guard;stand by assist  -  contact guard;stand by assist  -     Assistive Device (Gait)  walker, front-wheeled  -  walker, front-wheeled  -     Distance in Feet (Gait)  45  -JM  30  -JM     Deviations/Abnormal Patterns (Gait)  scot decreased;stride length decreased  -  scot decreased;stride length decreased  -     Number of Steps (Stairs)  --  reports NO steps  -     Comment (Gait/Stairs)  cues to slow pace for safety, but fatigues quickly  -  --     Recorded by [] Jeny Mitchell, [facility] [place] 02/05/20 1606 [JM] Jeny Mitchell, [facility] [place] 02/05/20 1314     Row Name 02/05/20 1555 02/05/20 0930          Therapeutic Exercise    Comment (Therapeutic Exercise)  THR protocol x 20 reps  -  THR protocol x 15 reps  -     Recorded by [] Jeny Mitchell, [facility] [place] 02/05/20 1606 [JM] Jeny Mitchell, [facility] [place] 02/05/20 1314     Row Name 02/05/20 1555 02/05/20 0930          Positioning and Restraints    Pre-Treatment Position  sitting in chair/recliner  -  sitting in chair/recliner  -     In Chair  reclined;call light within reach;encouraged to call for assist;exit alarm on  -  reclined;call light within reach;encouraged to call for assist;exit alarm on;with family/caregiver  -     Recorded by [] Jeny Mitchell, [facility] [place] 02/05/20 1606 [] Jeny Mitchell, [facility] [place] 02/05/20 1314     Row Name 02/05/20 1555 02/05/20 0930          Pain Scale: Numbers Pre/Post-Treatment    Pain Scale: Numbers, Pretreatment  4/10  -JM  4/10  -FRANKY     Pain Scale: Numbers, Post-Treatment  6/10  -FRANKY   --     Pain Location - Side  Left  -JM  Left  -JM     Pain Location  hip  -JM  hip  -JM     Pain Intervention(s)  Medication (See MAR);Repositioned;Cold applied  -  Medication (See MAR);Repositioned  -JM     Recorded by [] Jeny Mitchell PTA 02/05/20 1606 [] Jeny Mitchell PTA 02/05/20 1314     Row Name                Wound 02/04/20 0930 Left lateral hip Incision    Wound - Properties Group Date first assessed: 02/04/20 [AS] Time first assessed: 0930 [AS] Side: Left [AS] Orientation: lateral [AS] Location: hip [AS] Primary Wound Type: Incision [AS] Additional Comments: dermabond, small xeroform, island dressing, hip abduction pilow [AS] Recorded by:  [AS] Mary Alice Ruggiero, RN 02/04/20 0931      User Key  (r) = Recorded By, (t) = Taken By, (c) = Cosigned By    Initials Name Effective Dates Discipline     Jeny Mitchell, JEIMY 03/07/18 -  PT    AS Mary Alice Ruggiero, RN 06/16/16 -  Nurse          Wound 02/04/20 0930 Left lateral hip Incision (Active)   Dressing Appearance no drainage;intact;dry 2/5/2020 12:38 PM   Closure Liquid skin adhesive 2/5/2020 12:38 PM   Base clean;scab 2/5/2020 12:38 PM   Drainage Amount scant 2/5/2020 12:38 PM   Dressing Care, Wound dressing changed;other (see comments) 2/5/2020  8:24 AM               PT Recommendation and Plan     Plan of Care Reviewed With: patient(dtr came at end of session)  Outcome Summary: pt amb w/few cues req, but fatigued quickly limiting distance; 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    Turning from your back to your side while in flat bed without using bedrails?  3  -JM      Moving from lying on back to sitting on the side of a flat bed without bedrails?  2  -JM      Moving to and from a bed to a chair  (including a wheelchair)?  3  -JM      Standing up from a chair using your arms (e.g., wheelchair, bedside chair)?  3  -JM      Climbing 3-5 steps with a railing?  2  -JM      To walk in hospital room?  3  -JM      AM-PAC 6 Clicks Score (PT)  16  -JM        User Key  (r) = Recorded By, (t) = Taken By, (c) = Cosigned By    Initials Name Provider Type    Jeny Lincoln PTA Physical Therapy Assistant         Time Calculation:   PT Charges     Row Name 02/05/20 1606 02/05/20 1318          Time Calculation    Start Time  1415  -FRANKY  0945  -FRANKY     Stop Time  1501  -  1031  -     Time Calculation (min)  46 min  -FRANKY  46 min  -FRANKY     PT Received On  02/05/20  -FRANKY  02/05/20  -FRANKY     PT - Next Appointment  02/06/20  -FRANKY  02/05/20  -FRANKY        Time Calculation- PT    Total Timed Code Minutes- PT  23 minute(s)  -JM  16 minute(s)  -FRANKY       User Key  (r) = Recorded By, (t) = Taken By, (c) = Cosigned By    Initials Name Provider Type    Jeny Lincoln PTA Physical Therapy Assistant        Therapy Charges for Today     Code Description Service Date Service Provider Modifiers Qty    80048257524 HC PT THER PROC EA 15 MIN 2/5/2020 Jeny Mitchell PTA GP 1    46737841303 HC PT THER PROC GROUP 2/5/2020 Jeny Mitchell PTA GP 1    56978727709 HC PT THER PROC EA 15 MIN 2/5/2020 Jeny Mitchell PTA GP 2    10304067913 HC PT THER PROC GROUP 2/5/2020 Jeny Mitchell PTA GP 1          PT G-Codes  Outcome Measure Options: AM-PAC 6 Clicks Basic Mobility (PT)  AM-PAC 6 Clicks Score (PT): 16    Jeny Mitchell PTA  2/5/2020

## 2020-02-05 NOTE — PLAN OF CARE
Problem: Patient Care Overview  Goal: Plan of Care Review  Outcome: Ongoing (interventions implemented as appropriate)  Flowsheets  Taken 2/4/2020 1514 by Penny Lawler, RN  Progress: improving  Taken 2/5/2020 1314 by Jeny Mitchell PTA  Plan of Care Reviewed With: patient (dtr came at end of session)  Taken 2/5/2020 1720 by Hetal Mitchell RN  Outcome Summary: POD 1 LTH (Posterior), A&O, RA, SL, voiding per brp, assist x1, dressing changed this shift c/d/i, educated on bp monitoring, VSS, orders in for d/c tomorrow home w/HH  Goal: Individualization and Mutuality  Outcome: Ongoing (interventions implemented as appropriate)  Goal: Discharge Needs Assessment  Outcome: Ongoing (interventions implemented as appropriate)  Goal: Interprofessional Rounds/Family Conf  Outcome: Ongoing (interventions implemented as appropriate)     Problem: Fall Risk (Adult)  Goal: Identify Related Risk Factors and Signs and Symptoms  Outcome: Ongoing (interventions implemented as appropriate)  Goal: Absence of Fall  Outcome: Ongoing (interventions implemented as appropriate)     Problem: Hip Arthroplasty (Total, Partial) (Adult)  Goal: Signs and Symptoms of Listed Potential Problems Will be Absent, Minimized or Managed (Hip Arthroplasty)  Outcome: Ongoing (interventions implemented as appropriate)  Goal: Anesthesia/Sedation Recovery  Outcome: Ongoing (interventions implemented as appropriate)     Problem: Diabetes, Type 2 (Adult)  Goal: Signs and Symptoms of Listed Potential Problems Will be Absent, Minimized or Managed (Diabetes, Type 2)  Outcome: Ongoing (interventions implemented as appropriate)

## 2020-02-05 NOTE — PROGRESS NOTES
Orthopaedic Surgery  Progress Note  2/5/2020    Patients Name:  Tran Parra  YOB: 1955  Age:  64 y.o.  Medical Records Number:  [id number]  Date of Admission: 2/4/2020    No complaints except pain    Vitals:  Vitals:    02/04/20 1559 02/04/20 1900 02/04/20 2300 02/05/20 0300   BP: 116/71 123/77 119/64 98/65   BP Location: Right arm Left arm Left arm Left arm   Patient Position: Sitting Lying Lying Lying   Pulse: 59 88 84 83   Resp: 16 16 16 16   Temp: 97.4 °F (36.3 °C) 97.6 °F (36.4 °C) 97.8 °F (36.6 °C) 98.4 °F (36.9 °C)   TempSrc: Oral      SpO2: 95% 98% 97% 98%   Weight:       Height:           LLE:  NVI, calf nontender, Sensation intact  No signs of DVT    Incision: clean, no signs of infection    Lab Results (last 24 hours)     Procedure Component Value Units Date/Time    Basic Metabolic Panel [[id number]]  (Abnormal) Collected:  02/05/20 0438    Specimen:  Blood Updated:  02/05/20 0623     Glucose 121 mg/dL      BUN 14 mg/dL      Creatinine 0.94 mg/dL      Sodium 132 mmol/L      Potassium 4.3 mmol/L      Chloride 99 mmol/L      CO2 21.4 mmol/L      Calcium 8.0 mg/dL      eGFR Non African Amer 60 mL/min/1.73      BUN/Creatinine Ratio 14.9     Anion Gap 11.6 mmol/L     Narrative:       GFR Normal >60  Chronic Kidney Disease <60  Kidney Failure <15      CBC (No Diff) [[id number]]  (Abnormal) Collected:  02/05/20 0438    Specimen:  Blood Updated:  02/05/20 0533     WBC 6.61 10*3/mm3      RBC 3.64 10*6/mm3      Hemoglobin 8.9 g/dL      Hematocrit 27.2 %      MCV 74.7 fL      MCH 24.5 pg      MCHC 32.7 g/dL      RDW 14.6 %      RDW-SD 39.4 fl      MPV 9.6 fL      Platelets 208 10*3/mm3     POC Glucose Once [[id number]]  (Abnormal) Collected:  02/04/20 2101    Specimen:  Blood Updated:  02/04/20 2103     Glucose 216 mg/dL     POC Glucose Once [[id number]]  (Abnormal) Collected:  02/04/20 1651    Specimen:  Blood Updated:  02/04/20 1654     Glucose 260 mg/dL     POC Glucose Once [[id number]]   (Abnormal) Collected:  02/04/20 1221    Specimen:  Blood Updated:  02/04/20 1238     Glucose 223 mg/dL     POC Glucose Once [[id number]]  (Abnormal) Collected:  02/04/20 1046    Specimen:  Blood Updated:  02/04/20 1053     Glucose 179 mg/dL           Xr Hip 1 View Without Pelvis Left (surgery Only)    Result Date: 2/4/2020  Narrative: Left hip radiograph  HISTORY:Postop  TECHNIQUE: Single AP radiograph left  COMPARISON:Left hip radiograph 01/28/2020  FINDINGS: Post surgical changes from recent left total hip arthroplasty are present. The hardware is intact. There is no new periprosthetic fracture.      Impression: Postoperative changes from recent left total hip arthroplasty without findings of immediate complication.  This report was finalized on 2/4/2020 11:15 AM by Dr. Yanick Murdock M.D.      Xr Chest Pa & Lateral    Result Date: 1/28/2020  Narrative: XR CHEST PA AND LATERAL-, XR HIP W OR WO PELVIS 2-3 VIEW LEFT-  Clinical: Preop left hip surgery, hypertension  COMPARISON chest radiograph 9/10/2013  FINDINGS: Cardiac size stable, within normal limits. The mediastinum and arina have a satisfactory appearance. No effusion, vascular congestion or acute airspace disease demonstrated.  CONCLUSION: No active disease of the chest.  AP pelvis left hip findings: There is narrowing of the right hip joint, there is loss of the left hip joint with a bone-on-bone configuration and articular irregularity. There is subchondral cyst formation within the left femoral head. No indication of avascular necrosis, fracture, dislocation or bone lesion. Surrounding soft tissues have a satisfactory appearance.  CONCLUSION: Substantial left hip joint degeneration as described above.  This report was finalized on 1/28/2020 3:33 PM by Dr. Brant Pedersen M.D.      Mammo Screening Digital Tomosynthesis Bilateral With Cad    Result Date: 1/22/2020  Narrative: SCREENING MAMMOGRAM WITH R2 COMPUTERIZED ASSISTED DETECTION AND DIGITAL TOMOSYNTHESIS   HISTORY: Screening.  FINDINGS: Standard images and R2 computerized assisted detection were obtained followed by digital tomosynthesis. The breasts are fatty-replaced and symmetric. There are no findings to suggest malignancy.  CONCLUSION: Negative mammogram showing no change from 09/06/2018.  BI-RADS CATEGORY 1: Negative.  This report was finalized on 1/22/2020 11:34 AM by Dr. Justin Ceballos M.D.      Xr Hip With Or Without Pelvis 1 View Left    Result Date: 2/4/2020  Narrative: Left radiograph  HISTORY:Intraoperative radiograph the left hip joint total hip arthroplasty  TECHNIQUE: Single AP radiograph of the left hip taken in the operating room  COMPARISON:Left hip radiograph 01/20/2020      Impression: FINDINGS AND IMPRESSION: Stimulator lead is incompletely visualized over the left aspect of the pelvis. Surgical changes from ongoing left total hip arthroplasty are present. There is no finding of complication at this time..  This report was finalized on 2/4/2020 11:14 AM by Dr. Yanick Murdock M.D.      Xr Hip With Or Without Pelvis 2 - 3 View Left    Result Date: 1/28/2020  Narrative: XR CHEST PA AND LATERAL-, XR HIP W OR WO PELVIS 2-3 VIEW LEFT-  Clinical: Preop left hip surgery, hypertension  COMPARISON chest radiograph 9/10/2013  FINDINGS: Cardiac size stable, within normal limits. The mediastinum and arina have a satisfactory appearance. No effusion, vascular congestion or acute airspace disease demonstrated.  CONCLUSION: No active disease of the chest.  AP pelvis left hip findings: There is narrowing of the right hip joint, there is loss of the left hip joint with a bone-on-bone configuration and articular irregularity. There is subchondral cyst formation within the left femoral head. No indication of avascular necrosis, fracture, dislocation or bone lesion. Surrounding soft tissues have a satisfactory appearance.  CONCLUSION: Substantial left hip joint degeneration as described above.  This report was  finalized on 1/28/2020 3:33 PM by Dr. Brant Pedersen M.D.          Assessment/Plan:    Procedures: Left KELLY  Post-operative Day:  1  Weightbearing Status:  WBAT with walker  DVT Prophylaxis:  ASA for DVT prophylaxis    Dispostition:  Home with home health after PT tomorrow, if comfortable and mobilizing safely    Chris Roth PA-C  [facility]  [address] [place], [place] [address] [phone]    2/5/2020

## 2020-02-05 NOTE — THERAPY TREATMENT NOTE
Acute Care - Physical Therapy Treatment Note  [facility]     Patient Name: Tran Parra  : 1955  MRN: [id number]  Today's Date: 2020             Admit Date: 2020    Visit Dx:    ICD-10-CM ICD-9-CM   1. Primary osteoarthritis of left hip M16.12 715.15     Patient Active Problem List   Diagnosis   • Primary osteoarthritis of left hip       Therapy Treatment    Rehabilitation Treatment Summary     Row Name 20 0930             Treatment Time/Intention    Discipline  physical therapy assistant  -      Document Type  therapy note (daily note)  -      Subjective Information  complains of;weakness;fatigue;pain  -FRANKY      Mode of Treatment  group therapy;physical therapy  -      Care Plan Review  patient/other agree to care plan  -      Care Plan Review, Other Participant(s)  daughter  -      Comment  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, hip prec, fam works  -      Existing Precautions/Restrictions  fall;hip, posterior;left  -      Recorded by [] Jeny Mitchell PTA 20      Row Name 20 0930             Bed Mobility Assessment/Treatment    Comment (Bed Mobility)  educ in pm  -      Recorded by [] Jeny Mitchell PTA 20      Row Name 20 0930             Sit-Stand Transfer    Sit-Stand [place] (Transfers)  contact guard;verbal cues cues for hand placement  -      Assistive Device (Sit-Stand Transfers)  walker, front-wheeled  -      Recorded by [] Jeny Mitchell PTA 20      Row Name 20 0930             Gait/Stairs Assessment/Training    [place] Level (Gait)  contact guard;stand by assist  -      Assistive Device (Gait)  walker, front-wheeled  -      Distance in Feet (Gait)  30  -      Deviations/Abnormal Patterns (Gait)  scot decreased;stride length decreased  -      Number of Steps (Stairs)  reports NO steps  -      Recorded by [] Jeny Mitchell PTA 20      Row Name 20  0930             Therapeutic Exercise    Comment (Therapeutic Exercise)  THR protocol x 15 reps  -JM      Recorded by [FRANKY] Jeny Mitchell PTA 02/05/20 1314      Row Name 02/05/20 0930             Positioning and Restraints    Pre-Treatment Position  sitting in chair/recliner  -JM      In Chair  reclined;call light within reach;encouraged to call for assist;exit alarm on;with family/caregiver  -JM      Recorded by [FRANKY] Jeny Mitchell PTA 02/05/20 1314      Row Name 02/05/20 0930             Pain Scale: Numbers Pre/Post-Treatment    Pain Scale: Numbers, Pretreatment  4/10  -      Pain Location - Side  Left  -      Pain Location  hip  -      Pain Intervention(s)  Medication (See MAR);Repositioned  -      Recorded by [FRANKY] Jeny Mitchell PTA 02/05/20 1314      Row Name                Wound 02/04/20 0930 Left lateral hip Incision    Wound - Properties Group Date first assessed: 02/04/20 [AS] Time first assessed: 0930 [AS] Side: Left [AS] Orientation: lateral [AS] Location: hip [AS] Primary Wound Type: Incision [AS] Additional Comments: dermabond, small xeroform, island dressing, hip abduction pilow [AS] Recorded by:  [AS] Mary Alice Ruggiero, RN 02/04/20 0931      User Key  (r) = Recorded By, (t) = Taken By, (c) = Cosigned By    Initials Name Effective Dates Discipline     Jeny Mitchell PTA 03/07/18 -  PT    AS Mary Alice Ruggiero, RN 06/16/16 -  Nurse          Wound 02/04/20 0930 Left lateral hip Incision (Active)   Dressing Appearance no drainage;intact;dry 2/5/2020 12:38 PM   Closure Liquid skin adhesive 2/5/2020 12:38 PM   Base clean;scab 2/5/2020 12:38 PM   Drainage Amount scant 2/5/2020 12:38 PM   Dressing Care, Wound dressing changed;other (see comments) 2/5/2020  8:24 AM               PT Recommendation and Plan     Plan of Care Reviewed With: patient(dtr came at end of session)  Outcome Summary: pt amb w/few cues req, but fatigued quickly limiting distance; pt is concerned about going home too  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    Turning from your back to your side while in flat bed without using bedrails?  3  -JM      Moving from lying on back to sitting on the side of a flat bed without bedrails?  2  -JM      Moving to and from a bed to a chair (including a wheelchair)?  3  -JM      Standing up from a chair using your arms (e.g., wheelchair, bedside chair)?  3  -JM      Climbing 3-5 steps with a railing?  2  -JM      To walk in hospital room?  3  -JM      AM-PAC 6 Clicks Score (PT)  16  -        User Key  (r) = Recorded By, (t) = Taken By, (c) = Cosigned By    Initials Name Provider Type    Jeny Lincoln PTA Physical Therapy Assistant         Time Calculation:   PT Charges     Row Name 02/05/20 1318             Time Calculation    Start Time  0945  -      Stop Time  1031  -      Time Calculation (min)  46 min  -      PT Received On  02/05/20  -FRANKY      PT - Next Appointment  02/05/20  -FRANKY         Time Calculation- PT    Total Timed Code Minutes- PT  16 minute(s)  -        User Key  (r) = Recorded By, (t) = Taken By, (c) = Cosigned By    Initials Name Provider Type    Jeny Lincoln PTA Physical Therapy Assistant        Therapy Charges for Today     Code Description Service Date Service Provider Modifiers Qty    04122842204 HC PT THER PROC EA 15 MIN 2/5/2020 Jeny Mitchell PTA GP 1    48398219001 HC PT THER PROC GROUP 2/5/2020 Jeny Mitchell PTA GP 1          PT G-Codes  Outcome Measure Options: AM-PAC 6 Clicks Basic Mobility (PT)  AM-PAC 6 Clicks Score (PT): 16    Jeny Mitchell PTA  2/5/2020

## 2020-02-05 NOTE — DISCHARGE SUMMARY
Orthopaedic Surgery Discharge Summary    Patient Name:  Tran Parra  YOB: 1955  Age: 64 y.o.  Medical Records Number:  [id number]    Date of Admission:  2/4/2020  Date of Discharge:  2/6/2020    Primary Discharge Diagnosis:  Primary osteoarthritis of left hip [M16.12]    Secondary Discharge Diagnosis:    Problem List Items Addressed This Visit        Musculoskeletal and Integument    Primary osteoarthritis of left hip - Primary    Relevant Medications    oxyCODONE-acetaminophen (PERCOCET) 5-325 MG per tablet          Procedures Performed:  Left Total Hip Arthroplasty      Hospital Course:    Tran Parra is a 64 y.o.  female who underwent successful left dhaval on 2/4/2020.  Tran Parra was started on Aspirin 325 mg po twice daily immediately post-operatively for DVT prophylaxis.  On post-op day 1 the patients dressing was changed and their incision was clean, with no signs of infection and their calf was soft, with no signs of DVT.  The patient progressed well with physical therapy and the patients hemoglobin remained stable. On post-operative day 2 the patient was felt ready for discharge.     Vitals:  Vitals:    02/04/20 1559 02/04/20 1900 02/04/20 2300 02/05/20 0300   BP: 116/71 123/77 119/64 98/65   BP Location: Right arm Left arm Left arm Left arm   Patient Position: Sitting Lying Lying Lying   Pulse: 59 88 84 83   Resp: 16 16 16 16   Temp: 97.4 °F (36.3 °C) 97.6 °F (36.4 °C) 97.8 °F (36.6 °C) 98.4 °F (36.9 °C)   TempSrc: Oral      SpO2: 95% 98% 97% 98%   Weight:       Height:           Discharge Medications:      Discharge Medications      New Medications      Instructions Start Date   oxyCODONE-acetaminophen 5-325 MG per tablet  Commonly known as:  PERCOCET   1-2 tablets, Oral, Every 4 Hours PRN         Continue These Medications      Instructions Start Date   B-D UF III MINI PEN NEEDLES 31G X 5 MM misc  Generic drug:  Insulin Pen Needle   U UTD         ASK your doctor about these  medications      Instructions Start Date   CHLORHEXIDINE GLUCONATE CLOTH EX   Apply externally, AS DIRECTED PREOP       CRANBERRY PO   1 tablet, Oral, Daily, HOLD PRIOR TO SURG      DULoxetine 60 MG capsule  Commonly known as:  CYMBALTA   60 mg, Oral, Daily      fluticasone 50 MCG/ACT nasal spray  Commonly known as:  FLONASE   2 sprays, Nasal, Daily      GLYXAMBI 25-5 MG tablet  Generic drug:  Empagliflozin-linaGLIPtin   1 tablet, Oral, Daily      levothyroxine 112 MCG tablet  Commonly known as:  SYNTHROID, LEVOTHROID   112 mcg, Oral, Daily      losartan 50 MG tablet  Commonly known as:  COZAAR   50 mg, Oral, Daily      meloxicam 15 MG tablet  Commonly known as:  MOBIC   15 mg, Oral, Daily, INSTRUCTED PT TO FOLLOW DR OLSON INSTRUCTIONS REGARDING HOLDING FOR SURGERY      metFORMIN 1000 MG tablet  Commonly known as:  GLUCOPHAGE   1,000 mg, Oral, 2 Times Daily With Meals      mupirocin 2 % nasal ointment  Commonly known as:  BACTROBAN   Nasal, AS DIRECTED PREOP       omeprazole 40 MG capsule  Commonly known as:  priLOSEC   40 mg, Oral, Daily PRN      simvastatin 40 MG tablet  Commonly known as:  ZOCOR   40 mg, Oral, Nightly      TRESIBA FLEXTOUCH 200 UNIT/ML solution pen-injector pen injection  Generic drug:  Insulin Degludec   32 Units, Subcutaneous, Daily      triamterene-hydrochlorothiazide 37.5-25 MG per tablet  Commonly known as:  MAXZIDE-25   1 tablet, Oral, Daily             Pain Medications:  Percocet 5/325mg 1-2 po q 4-6 hours prn pain    DVT Prophylaxis:  Enteric Coated Aspirin 325 mg po twice daily for 2 weeks, then one daily for 4 weeks    Total Hip Replacement Discharge Instructions:    I. ACTIVITIES:  1. Exercises:  ? Complete exercise program as taught by the hospital physical therapist 2 times per day  ? Exercise program will be advanced by the physical therapist  ? During the day be up ambulating every 2 hours (while awake) for short distances  ? Complete the ankle pump exercises at least 10 times  per hour (while awake)  ? Elevate legs when in bed and for at least 30 minutes during the day.Use cold packs 20-30 minutes approximately 5 times per day. This should be done before and after completing your exercises and at any time you are experiencing pain/ stiffness in your operative extremity.      2. Activities of Daily Living:  ? No tub baths, hot tubs, or swimming pools for 4 weeks  ? May shower and let water run over the incision on post-operative day #5 if no drainage. Do not scrub or rub the incision. Simply let the water run over the incision and pat dry.    II. Restrictions  ? Continue hip precautions as taught at the hospital  ? Your surgeon will discuss with you when you will be able to drive again.  ? Weight bearing is as tolerated  ? First week stay inside on even terrain. May go up and down stairs one stair at a time utilizing the hand rail once cleared by physical therapy to do so.  ? After one week, you may venture outside (if cleared to do so by physical therapist).    III. Precautions:  ? Everyone that comes near you should wash their hands  ? No elective dental, genital-urinary, or colon procedures or surgical procedures for 12 weeks after surgery unless absolutely necessary.  ?  If dental work or surgical procedure is deemed absolutely necessary, you will need to contact your surgeon as you will need to take antibiotics 1 hour prior to any dental work (including teeth cleanings).  ? Please discuss with your surgeon prophylactic antibiotics as the length of time this intervention will be necessary for you varies with each patient’s health history and situation.  ? Avoid sick people. If you must be around someone who is ill, they should wear a mask.  ? Avoid visits to the Emergency Room or Urgent Care unless you are having a life threatening event.   ? If ordered stockings are to be placed on in the morning and removed at night. Monitor the stockings to ensure that any swelling is not causing  the stockings to become too tight. In this case, remove stockings immediately.    IV. INCISION CARE:  ? Wash your hands prior to dressing changes  ? Change the dressing as needed to keep incision clean and dry. Utilize dry gauze and paper tape. Avoid touching the side of the gauze that goes against the incision with your hands.  ? No creams or ointments to the incision  ? May remove dressing once the incision is free of drainage  ? Do not touch or pick at the incision  ? Check incision every day and notify surgeon immediately if any of the following signs or symptoms are noted:  o Increase in redness  o Increase in swelling around the incision and of the entire extremity  o Increase in pain  o Drainage oozing from the incision  o Pulling apart of the edges of the incision  o Increase in overall body temperature (greater than 100.5 degrees)  ? Your surgeon will instruct you regarding suture or staple removal    V. Medications:   1. Anticoagulants: You will be discharged on an anticoagulant. This is a prophylactic medication that helps prevent blood clots during your post-operative period. The type and length of dosage varies based on your individual needs, procedure performed, and surgeon’s preference.  ? While taking the anticoagulant, you should avoid taking any additional aspirin, ibuprofen (Advil or Motrin), Aleve (Naprosyn) or other non-steroidal anti-inflammatory medications.   ? Notify surgeon immediately if any mike bleeding is noted in the urine, stool, emesis, or from the nose or the incision. Blood in the stool will often appear as black rather than red. Blood in urine may appear as pink. Blood in emesis may appear as brown/black like coffee grounds.  ? You will need to apply pressure for longer periods of time to any cuts or abrasions to stop bleeding  ? Avoid alcohol while taking anticoagulants    2. Stool Softeners: You will be at greater risk of constipation after surgery due to being less mobile and  the pain medications.   ? Take stool softeners as instructed by your surgeon while on pain medications. Over the counter Colace 100 mg 1-2 capsules twice daily.   ? If stools become too loose or too frequent, please decreases the dosage or stop the stool softener.  ? If constipation occurs despite use of stool softeners, you are to continue the stool softeners and add a laxative (Milk of Magnesia 1 ounce daily as needed)  ? Drink plenty of fluids, and eat fruits and vegetables during your recovery time    3. Pain Medications utilized after surgery are narcotics and the law requires that the following information be given to all patients that are prescribed narcotics:  ? CLASSIFICATION: Pain medications are called Opioids and are narcotics  ? LEGALITIES: It is illegal to share narcotics with others and to drive within 24 hours of taking narcotics  ? POTENTIAL SIDE EFFECTS: Potential side effects of opioids include: nausea, vomiting, itching, dizziness, drowsiness, dry mouth, constipation, and difficulty urinating.  ? POTENTIAL ADVERSE EFFECTS:   o Opioid tolerance can develop with use of pain medications and this simply means that it requires more and more of the medication to control pain; however, this is seen more in patients that use opioids for longer periods of time.  o Opioid dependence can develop with use of Opioids and this simply means that to stop the medication can cause withdrawal symptoms; however, this is seen with patients that use Opioids for longer periods of time.  o Opioid addiction can develop with use of Opioids and the incidence of this is very unlikely in patients who take the medications as ordered and stop the medications as instructed.  o Opioid overdose can be dangerous, but is unlikely when the medication is taken as ordered and stopped when ordered. It is important not to mix opioids with alcohol or with and type of sedative such as Benadryl as this can lead to over sedation and  respiratory difficulty.  ? DOSAGE:   o Pain medications will need to be taken consistently for the first week to decrease pain and promote adequate pain relief and participation in physical therapy.  o After the initial surgical pain begins to resolve, you may begin to decrease the pain medication. By the end of 6-8 weeks, you should be off of pain medications.  o Refills will not be given by the office during evening hours, on weekends, or after 6-8 weeks post-op.  o To seek refills on pain medications during the initial 6 week post-operative period, you must call the office 48 hours in advance to request the refill. The office will then notify you when to  the prescription. DO NOT wait until you are out of the medication to request a refill.    V. FOLLOW-UP VISITS:  ? You will need to follow up in the office with your surgeon in 3 weeks. Please call this number [phone]  to schedule this appointment.  If you have any concerns or suspected complications prior to your follow up visit, please call your surgeons office. Do not wait until your appointment time if you suspect complications. These will need to be addressed in the office promptly.    Chris Roth PA-C  [facility]  [address] [place] [address]  [phone]    2/5/2020    CC:Omar Whitley MD:BRIAN Bass

## 2020-02-06 VITALS
DIASTOLIC BLOOD PRESSURE: 67 MMHG | TEMPERATURE: 97.5 F | HEIGHT: 61 IN | WEIGHT: 172.8 LBS | OXYGEN SATURATION: 93 % | BODY MASS INDEX: 32.62 KG/M2 | HEART RATE: 101 BPM | SYSTOLIC BLOOD PRESSURE: 114 MMHG | RESPIRATION RATE: 16 BRPM

## 2020-02-06 LAB
GLUCOSE BLDC GLUCOMTR-MCNC: 133 MG/DL (ref 70–130)
GLUCOSE BLDC GLUCOMTR-MCNC: 84 MG/DL (ref 70–130)

## 2020-02-06 PROCEDURE — 97110 THERAPEUTIC EXERCISES: CPT

## 2020-02-06 PROCEDURE — 97150 GROUP THERAPEUTIC PROCEDURES: CPT

## 2020-02-06 PROCEDURE — 82962 GLUCOSE BLOOD TEST: CPT

## 2020-02-06 RX ORDER — OXYCODONE HYDROCHLORIDE AND ACETAMINOPHEN 5; 325 MG/1; MG/1
1-2 TABLET ORAL EVERY 4 HOURS PRN
Qty: 84 TABLET | Refills: 0 | Status: SHIPPED | OUTPATIENT
Start: 2020-02-06 | End: 2020-06-04

## 2020-02-06 RX ADMIN — LEVOTHYROXINE SODIUM 112 MCG: 112 TABLET ORAL at 06:36

## 2020-02-06 RX ADMIN — LOSARTAN POTASSIUM 50 MG: 50 TABLET, FILM COATED ORAL at 08:30

## 2020-02-06 RX ADMIN — OXYCODONE HYDROCHLORIDE AND ACETAMINOPHEN 2 TABLET: 5; 325 TABLET ORAL at 08:31

## 2020-02-06 RX ADMIN — ASPIRIN 325 MG: 325 TABLET, COATED ORAL at 08:30

## 2020-02-06 RX ADMIN — ATORVASTATIN CALCIUM 20 MG: 20 TABLET, FILM COATED ORAL at 08:32

## 2020-02-06 RX ADMIN — PANTOPRAZOLE SODIUM 40 MG: 40 TABLET, DELAYED RELEASE ORAL at 06:36

## 2020-02-06 RX ADMIN — OXYCODONE AND ACETAMINOPHEN 1 TABLET: 5; 325 TABLET ORAL at 01:24

## 2020-02-06 RX ADMIN — OXYCODONE HYDROCHLORIDE AND ACETAMINOPHEN 2 TABLET: 5; 325 TABLET ORAL at 13:48

## 2020-02-06 RX ADMIN — DULOXETINE HYDROCHLORIDE 60 MG: 60 CAPSULE, DELAYED RELEASE ORAL at 08:31

## 2020-02-06 RX ADMIN — TRIAMTERENE AND HYDROCHLOROTHIAZIDE 1 TABLET: 37.5; 25 TABLET ORAL at 08:31

## 2020-02-06 RX ADMIN — METFORMIN HYDROCHLORIDE 1000 MG: 1000 TABLET ORAL at 08:30

## 2020-02-06 RX ADMIN — FERROUS SULFATE TAB 325 MG (65 MG ELEMENTAL FE) 325 MG: 325 (65 FE) TAB at 08:30

## 2020-02-06 RX ADMIN — SODIUM CHLORIDE, PRESERVATIVE FREE 3 ML: 5 INJECTION INTRAVENOUS at 08:32

## 2020-02-06 NOTE — PLAN OF CARE
Problem: Patient Care Overview  Goal: Plan of Care Review  Note:   Pt is a post op day 2 of a left posterior hip. Dressing is clean dry and intact. Pt continues with the Island dressing. Pt continues with PO pain meds that provide relief. Pt has ambulated to the bathroom with an assist x1. Voiding function intact. Pt educated on the importance of monitoring blood sugars related to comorbidity of Diabetes. Pt voiced understanding.Pt is resting at this time, will continue to monitor.

## 2020-02-06 NOTE — NURSING NOTE
Patient's daughter, Lucrecia, took her d/c meds from our retail pharmacy home with her today; pt is to d/c tomorrow.

## 2020-02-06 NOTE — THERAPY TREATMENT NOTE
Acute Care - Physical Therapy Treatment Note  [facility]     Patient Name: Tran Parra  : 1955  MRN: [id number]  Today's Date: 2020             Admit Date: 2020    Visit Dx:    ICD-10-CM ICD-9-CM   1. Primary osteoarthritis of left hip M16.12 715.15     Patient Active Problem List   Diagnosis   • Primary osteoarthritis of left hip       Therapy Treatment    Rehabilitation Treatment Summary     Row Name 20             Treatment Time/Intention    Discipline  physical therapy assistant  -      Document Type  therapy note (daily note);discharge treatment  -      Subjective Information  complains of;pain;fatigue  -FRANKY      Mode of Treatment  group therapy;physical therapy  -      Care Plan Review  patient/other agree to care plan  -      Existing Precautions/Restrictions  fall;hip, posterior;left  -      Recorded by [FRANKY] Jeny Mitchell PTA 20      Row Name 20             Sit-Stand Transfer    Sit-Stand [place] (Transfers)  contact guard;verbal cues cues for hand placement  -      Assistive Device (Sit-Stand Transfers)  walker, front-wheeled  -FRANKY      Recorded by [JM] Jeny Mitchell PTA 20      Row Name 20 09             Gait/Stairs Assessment/Training    [place] Level (Gait)  contact guard;stand by assist  -      Assistive Device (Gait)  walker, front-wheeled  -      Distance in Feet (Gait)  120  -JM      Deviations/Abnormal Patterns (Gait)  scot decreased;stride length decreased  -      Recorded by [JM] Jeny Mitchell PTA 20      Row Name 20             Therapeutic Exercise    Comment (Therapeutic Exercise)  THR protocol x 25 reps  -      Recorded by [JM] Jeny Mitchell PTA 20      Row Name 20 09             Positioning and Restraints    Pre-Treatment Position  sitting in chair/recliner  -      In Chair  reclined;call light within reach;encouraged to call for  assist;exit alarm on;notified nsg  -JM      Recorded by [JM] Jeny Mitchell PTA 02/06/20 1804      Row Name 02/06/20 0930             Pain Scale: Numbers Pre/Post-Treatment    Pain Scale: Numbers, Pretreatment  5/10  -JM      Pain Scale: Numbers, Post-Treatment  5/10  -JM      Pain Location - Side  Left  -      Pain Location  hip  -      Pain Intervention(s)  Medication (See MAR);Repositioned;Cold applied  -JM      Recorded by [JM] Jeny Mitchell PTA 02/06/20 1804      Row Name                Wound 02/04/20 0930 Left lateral hip Incision    Wound - Properties Group Date first assessed: 02/04/20 [AS] Time first assessed: 0930 [AS] Side: Left [AS] Orientation: lateral [AS] Location: hip [AS] Primary Wound Type: Incision [AS] Additional Comments: dermabond, small xeroform, island dressing, hip abduction pilow [AS] Recorded by:  [AS] Mary Alice Ruggiero, RN 02/04/20 0931      User Key  (r) = Recorded By, (t) = Taken By, (c) = Cosigned By    Initials Name Effective Dates Discipline     Jeny Mitchell PTA 03/07/18 -  PT    AS Mary Alice Ruggiero, RN 06/16/16 -  Nurse          Wound 02/04/20 0930 Left lateral hip Incision (Active)   Dressing Appearance dry;intact;no drainage 2/6/2020  1:48 PM   Closure Liquid skin adhesive 2/6/2020  1:48 PM   Base clean;dry 2/6/2020  1:48 PM   Drainage Amount none 2/6/2020  1:48 PM   Dressing Care, Wound dressing changed;other (see comments) 2/6/2020  1:48 PM               PT Recommendation and Plan     Plan of Care Reviewed With: patient(dtr came at end of session)  Outcome Summary: pt amb w/few cues req, but fatigued quickly limiting distance; 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    Turning from your back to your side while in  flat bed without using bedrails?  3  -JM      Moving from lying on back to sitting on the side of a flat bed without bedrails?  2  -JM      Moving to and from a bed to a chair (including a wheelchair)?  3  -JM      Standing up from a chair using your arms (e.g., wheelchair, bedside chair)?  3  -JM      Climbing 3-5 steps with a railing?  2  -JM      To walk in hospital room?  3  -JM      AM-PAC 6 Clicks Score (PT)  16  -        User Key  (r) = Recorded By, (t) = Taken By, (c) = Cosigned By    Initials Name Provider Type    Jeny Lincoln PTA Physical Therapy Assistant         Time Calculation:   PT Charges     Row Name 02/06/20 1804             Time Calculation    Start Time  0930  -      Stop Time  1006  -      Time Calculation (min)  36 min  -         Time Calculation- PT    Total Timed Code Minutes- PT  15 minute(s)  -        User Key  (r) = Recorded By, (t) = Taken By, (c) = Cosigned By    Initials Name Provider Type    Jeny Lincoln PTA Physical Therapy Assistant        Therapy Charges for Today     Code Description Service Date Service Provider Modifiers Qty    83322298223 HC PT THER PROC EA 15 MIN 2/5/2020 Jeny Mitchell, PTA GP 1    47240474837 HC PT THER PROC GROUP 2/5/2020 Jeny Mitchell, PTA GP 1    86058436661 HC PT THER PROC EA 15 MIN 2/5/2020 Jeny Mitchell, PTA GP 2    62268696201 HC PT THER PROC GROUP 2/5/2020 Jeny Mitchell, PTA GP 1    57562727865 HC PT THER PROC EA 15 MIN 2/6/2020 Jeny Mitchell, PTA GP 1    68971349504 HC PT THER PROC GROUP 2/6/2020 Jeny Mitchell, PTA GP 1          PT G-Codes  Outcome Measure Options: AM-PAC 6 Clicks Basic Mobility (PT)  AM-PAC 6 Clicks Score (PT): 16    Jeny Mitchell PTA  2/6/2020

## 2020-02-06 NOTE — PROGRESS NOTES
Orthopaedic Surgery  Progress Note  2/6/2020    Patients Name:  Tran Parra  YOB: 1955  Age: 64 y.o.  Medical Records Number:  [id number]  Date of Admission: 2/4/2020    No complaints except pain    Vitals:  Vitals:    02/05/20 1900 02/05/20 2300 02/06/20 0300 02/06/20 0713   BP: 121/66 121/64 116/65 123/66   BP Location: Left arm Left arm Left arm Left arm   Patient Position: Sitting Lying Lying Sitting   Pulse: 90 96 84 89   Resp: 16 16 16 16   Temp: 97.8 °F (36.6 °C) 98.8 °F (37.1 °C) 97.7 °F (36.5 °C) 98.3 °F (36.8 °C)   TempSrc:    Oral   SpO2: 94% 94% 96% 97%   Weight:       Height:           LLE:  NVI, calf nontender, sensation intact  No signs of DVT    Incision: clean, no signs of infection    Lab Results (last 24 hours)     Procedure Component Value Units Date/Time    POC Glucose Once [[id number]]  (Normal) Collected:  02/06/20 0613    Specimen:  Blood Updated:  02/06/20 0615     Glucose 84 mg/dL     POC Glucose Once [[id number]]  (Normal) Collected:  02/05/20 2104    Specimen:  Blood Updated:  02/05/20 2106     Glucose 129 mg/dL     POC Glucose Once [[id number]]  (Abnormal) Collected:  02/05/20 1643    Specimen:  Blood Updated:  02/05/20 1646     Glucose 162 mg/dL     POC Glucose Once [[id number]]  (Abnormal) Collected:  02/05/20 1117    Specimen:  Blood Updated:  02/05/20 1120     Glucose 165 mg/dL           Assesment/Plan:    Procedures:  Left KELLY  Postoperative Day: 2  Weightbearing Status:  WBAT with walker  DVT Prophylaxis:  ASA for DVT prophylaxis    Reason for Inpatient Admission:  I certify that this patient requires inpatient admission for greater than 2 midnights due to fall risk with history of frequent falls    Disposition:  Home with home health after PT today, if comfortable and mobilizing safely    Chris Roth PA-C  [facility]  [address] [place] [address]  [phone]    2/6/2020

## 2020-02-06 NOTE — PROGRESS NOTES
Continued Stay Note  [facility]     Patient Name: Tran Parra  MRN: [id number]  Today's Date: 2/6/2020    Admit Date: 2/4/2020    Discharge Plan     Row Name 02/06/20 1551       Plan    Final Discharge Disposition Code  06 - home with home health care    Final Note  Pt d/c'ed home with [facility].        Discharge Codes    No documentation.       Expected Discharge Date and Time     Expected Discharge Date Expected Discharge Time    Feb 6, 2020             Lucrecia Vargas RN

## 2021-02-23 ENCOUNTER — TRANSCRIBE ORDERS (OUTPATIENT)
Dept: ADMINISTRATIVE | Facility: HOSPITAL | Age: 66
End: 2021-02-23

## 2021-02-23 DIAGNOSIS — Z12.31 SCREENING MAMMOGRAM FOR HIGH-RISK PATIENT: Primary | ICD-10-CM

## 2021-02-24 ENCOUNTER — HOSPITAL ENCOUNTER (OUTPATIENT)
Dept: MAMMOGRAPHY | Facility: HOSPITAL | Age: 66
Discharge: HOME | End: 2021-02-24
Admitting: NURSE PRACTITIONER

## 2021-02-24 DIAGNOSIS — Z12.31 SCREENING MAMMOGRAM FOR HIGH-RISK PATIENT: ICD-10-CM

## 2021-02-24 PROCEDURE — 77067 SCR MAMMO BI INCL CAD: CPT

## 2021-02-24 PROCEDURE — 77063 BREAST TOMOSYNTHESIS BI: CPT

## (undated) DEVICE — ADHS SKIN DERMABOND TOP ADVANCED

## (undated) DEVICE — GLV SURG PREMIERPRO ORTHO LTX PF SZ7.5 BRN

## (undated) DEVICE — DRSNG WND GZ PAD BORDERED 4X8IN STRL

## (undated) DEVICE — RECIPROCATING BLADE HEAVY DUTY LONG, OFFSET  (77.6 X 0.77 X 11.2MM)

## (undated) DEVICE — COVER,LIGHT HANDLE,FLX,2/PK: Brand: MEDLINE INDUSTRIES, INC.

## (undated) DEVICE — SUT MNCRYL 3/0 PS2 18IN MCP497G

## (undated) DEVICE — DECANT BG O JET

## (undated) DEVICE — ANTIBACTERIAL UNDYED BRAIDED (POLYGLACTIN 910), SYNTHETIC ABSORBABLE SUTURE: Brand: COATED VICRYL

## (undated) DEVICE — GLV SURG BIOGEL LTX PF 7 1/2

## (undated) DEVICE — PK HIP TOTL 40

## (undated) DEVICE — HEWSON SUTURE RETRIEVER: Brand: HEWSON SUTURE RETRIEVER

## (undated) DEVICE — DRSNG GZ PETROLTM XEROFORM CURAD 1X8IN STRL

## (undated) DEVICE — APPL CHLORAPREP W/TINT 26ML ORNG

## (undated) DEVICE — PILLW ABD SM

## (undated) DEVICE — NEEDLE, QUINCKE, 20GX3.5": Brand: MEDLINE

## (undated) DEVICE — DRAPE,REIN 53X77,STERILE: Brand: MEDLINE

## (undated) DEVICE — SYR LUERLOK 30CC

## (undated) DEVICE — 3M™ IOBAN™ 2 ANTIMICROBIAL INCISE DRAPE 6648EZ: Brand: IOBAN™ 2

## (undated) DEVICE — HANDPIECE SET WITH COAXIAL HIGH FLOW TIP AND SUCTION TUBE: Brand: INTERPULSE